# Patient Record
Sex: MALE | Race: WHITE | Employment: FULL TIME | URBAN - METROPOLITAN AREA
[De-identification: names, ages, dates, MRNs, and addresses within clinical notes are randomized per-mention and may not be internally consistent; named-entity substitution may affect disease eponyms.]

---

## 2017-06-28 ENCOUNTER — TELEPHONE (OUTPATIENT)
Dept: TRANSPLANT | Facility: CLINIC | Age: 27
End: 2017-06-28

## 2017-06-28 NOTE — TELEPHONE ENCOUNTER
"Logged in as: [email or web address]. Logout.  Incomplete   Pending   Registered   Archived Change Log Done Living Kidney Donor Evaluation Completed: 2017 11:11:58 CT Updated: 2017 11:12:08 CT  Donor Name: Shantanu Martinez MRN: Note: : 1990 Age: 27Gender: Male Donor Height: 5  8\" Weight (lb): 135 BMI: 20.5  Donor Race:  Ethnicity: Not / Donor Preferred Language: English  Required?: No Current Marital Status: Single  Demographics: Home Address: [address] [place] [address] City: [place] State: [place] Zip: [address] Country: [place]  Best Phone: [phone] Alt Phone: Donor Email: [email or web address] Best Phone Type: Mobile Alt Phone Type:   Preferred Contact Time(s): 09:00 AM-11:00 AM, 11:00 AM-1:00 PM, 1:00 PM-4:00 PM Preferred Contact Day(s): Mon, Tue, Wed, Thur, Fri  Donor Screen: PASSED Donor Referred by: Tx Candidate Donor self reported ABO: O  Recipient Information: Recipient Name (Last, First): Lisbet Carrasquillo Recipient :    1990  ... Donor Relationship: Acquaintance Recipient Diagnosis: Recipient ABO:   MEDICAL HISTORY:  None Reported  MEDICATIONS:  None Reported  SURGICAL HISTORY:  Fracture Repair, Right Forearm, NOS  G-Tube Placement  Inguinal Hernia Repair  Repair, Tracheoesophageal Fistula, NOS  ALLERGIES:  Amoxicillin : Rash  demoral : Rash  SOCIAL HISTORY:  EtOH: Rare (1-2 drinks/year)  Illicit Drug Use: Denies  Tobacco: Denies  SELF-REPORTED FUNCTIONAL STATUS:  \"I am able to participate in strenuous sports such as swimming, singles tennis, football, basketball, or skiing\"  Exercise (1 X per week)  REVIEW OF ORGAN SYSTEMS: Airway or Lungs: No Blood Disorder: No Cancer: No Diabetes,Thyroid,Adrenal,Endocrine Disorder: No Digestive or Liver: No Heart or Circulatory System: No Immune Diseases: No Kidneys and Bladder: No Male Health: No Muscles,Bones,Joints: No Neuro: No Psych: No  FAMILY HISTORY: Confirmed:  Denied:  Cancer (denies)  Diabetes (denies)  Heart Disease " (denies)  Hypertension (denies)  Kidney Disease (denies)  Kidney Stones (denies)  DONOR INFORMATION:  Level of Education: High school or secondary school degree complete Employment Status: Full Time Employer: [employer] Insurance Status: Has medical insurance Current Accommodation: Owns own home/apartment Living Arrangement: With spouse Allow Disclosure to Recipient: Yes Paired Kidney Exchange Education Level: Has learned about Paired Kidney Exchange Paired Kidney Exchange Participation Consent: Yes, only for mismatch Donor Motivation: Highly motivated donor  HIGH RISK BEHAVIOR:  Blood transfusion < 12 months. (NO)  Commercial sex < 12 months. (NO)  Illicit IV drug use < 5yrs. (NO)  Male:male sexual contact < 5yrs. (NO)  Other high risk sexual contact < 12 months. (NO)  EMERGENCY CONTACT INFORMATION:  Primary Secondary First Name: Kishore Last Name: Cyril Phone Number: [phone] Relationship: Mother  First Name: Lashawn  Last Name: Isiah Phone Number: [phone] Relationship: Spouse  REASON FOR DONATION:   I want to donate to help save a life and make sure someone get to live life to the fullest without any problems and the least amount of pain   PHYSICIAN CONTACT INFORMATION:  PCP  Name: Michael Zelaya   City: [place] State: [place]  Phone:   ADDITIONAL NOTES:   REVIEWED BY:    TODAY'S DATE:   ... Done  I want to donate to help save a life and make sure someone get to live life to the fullest without any problems and the least amount of pain

## 2017-06-29 ENCOUNTER — TELEPHONE (OUTPATIENT)
Dept: TRANSPLANT | Facility: CLINIC | Age: 27
End: 2017-06-29

## 2017-06-29 ENCOUNTER — DOCUMENTATION ONLY (OUTPATIENT)
Dept: TRANSPLANT | Facility: CLINIC | Age: 27
End: 2017-06-29

## 2017-06-29 NOTE — TELEPHONE ENCOUNTER
Is abo compat. Interested in PEP. Tested a couple years ago for another recip-we have his abo here. Will now send Phase 1 orders EXCEPT no abo since done prev and donor pkt.

## 2017-06-29 NOTE — TELEPHONE ENCOUNTER
Left message asking Shantanu to rewturn call for screening. Is abo O. Need to discuss his G Tube placement mentioned in his questionaire. No pkt sent yet.

## 2017-06-29 NOTE — PROGRESS NOTES
Gtube mentioned on Breeze was when he was an infant so no longer has. Was placed d/t Transesophageal Fistula. No problems since.

## 2017-07-20 ENCOUNTER — DOCUMENTATION ONLY (OUTPATIENT)
Dept: TRANSPLANT | Facility: CLINIC | Age: 27
End: 2017-07-20

## 2017-07-26 ENCOUNTER — TELEPHONE (OUTPATIENT)
Dept: TRANSPLANT | Facility: CLINIC | Age: 27
End: 2017-07-26

## 2017-07-26 DIAGNOSIS — Z00.5 TRANSPLANT DONOR EVALUATION: ICD-10-CM

## 2017-07-26 NOTE — TELEPHONE ENCOUNTER
Informed Sadiq his Phase 1's are OK. Average GFR=84. Confirmed interest in PEP also. Wants to come for eval. Has CD. [place] not left [place] in past 3 months.

## 2017-08-01 ENCOUNTER — APPOINTMENT (OUTPATIENT)
Dept: CHIROPRACTIC MEDICINE | Facility: OTHER | Age: 27
End: 2017-08-01

## 2017-08-01 ENCOUNTER — APPOINTMENT (OUTPATIENT)
Dept: OCCUPATIONAL MEDICINE | Facility: OTHER | Age: 27
End: 2017-08-01

## 2017-08-01 PROCEDURE — 99000 SPECIMEN HANDLING OFFICE-LAB: CPT

## 2017-08-01 PROCEDURE — 99499 UNLISTED E&M SERVICE: CPT

## 2017-08-01 PROCEDURE — 97799 UNLISTED PHYSCL MED/REHAB PX: CPT

## 2017-08-03 ENCOUNTER — ALLIED HEALTH/NURSE VISIT (OUTPATIENT)
Dept: TRANSPLANT | Facility: CLINIC | Age: 27
End: 2017-08-03
Attending: TRANSPLANT SURGERY

## 2017-08-03 ENCOUNTER — INFUSION THERAPY VISIT (OUTPATIENT)
Dept: INFUSION THERAPY | Facility: CLINIC | Age: 27
End: 2017-08-03
Attending: INTERNAL MEDICINE

## 2017-08-03 ENCOUNTER — OFFICE VISIT (OUTPATIENT)
Dept: TRANSPLANT | Facility: CLINIC | Age: 27
End: 2017-08-03
Attending: TRANSPLANT SURGERY

## 2017-08-03 ENCOUNTER — OFFICE VISIT (OUTPATIENT)
Dept: NEPHROLOGY | Facility: CLINIC | Age: 27
End: 2017-08-03
Attending: TRANSPLANT SURGERY

## 2017-08-03 VITALS — DIASTOLIC BLOOD PRESSURE: 75 MMHG | SYSTOLIC BLOOD PRESSURE: 116 MMHG

## 2017-08-03 VITALS
BODY MASS INDEX: 21.97 KG/M2 | DIASTOLIC BLOOD PRESSURE: 90 MMHG | SYSTOLIC BLOOD PRESSURE: 118 MMHG | HEIGHT: 67 IN | WEIGHT: 140 LBS | TEMPERATURE: 97.5 F | OXYGEN SATURATION: 98 % | HEART RATE: 64 BPM

## 2017-08-03 VITALS — DIASTOLIC BLOOD PRESSURE: 74 MMHG | SYSTOLIC BLOOD PRESSURE: 113 MMHG

## 2017-08-03 DIAGNOSIS — Z00.5 TRANSPLANT DONOR EVALUATION: Primary | ICD-10-CM

## 2017-08-03 DIAGNOSIS — Z01.818 PRE-OP EXAM: Primary | ICD-10-CM

## 2017-08-03 DIAGNOSIS — Z00.5 EXAMINATION OF POTENTIAL DONOR OF ORGAN AND TISSUE: Primary | ICD-10-CM

## 2017-08-03 DIAGNOSIS — Z00.5 TRANSPLANT DONOR EVALUATION: ICD-10-CM

## 2017-08-03 DIAGNOSIS — Z00.5 WILLING TO BE KIDNEY DONOR: ICD-10-CM

## 2017-08-03 LAB
ABO + RH BLD: NORMAL
ABO + RH BLD: NORMAL
ALBUMIN SERPL-MCNC: 4.6 G/DL (ref 3.4–5)
ALBUMIN UR-MCNC: NEGATIVE MG/DL
ALP SERPL-CCNC: 54 U/L (ref 40–150)
ALT SERPL W P-5'-P-CCNC: 28 U/L (ref 0–70)
ANION GAP SERPL CALCULATED.3IONS-SCNC: 8 MMOL/L (ref 3–14)
APPEARANCE UR: CLEAR
APTT PPP: 30 SEC (ref 22–37)
AST SERPL W P-5'-P-CCNC: 25 U/L (ref 0–45)
BILIRUB SERPL-MCNC: 1.3 MG/DL (ref 0.2–1.3)
BILIRUB UR QL STRIP: NEGATIVE
BLD GP AB SCN SERPL QL: NORMAL
BLOOD BANK CMNT PATIENT-IMP: NORMAL
BUN SERPL-MCNC: 16 MG/DL (ref 7–30)
CALCIUM SERPL-MCNC: 9.1 MG/DL (ref 8.5–10.1)
CHLORIDE SERPL-SCNC: 105 MMOL/L (ref 94–109)
CHOLEST SERPL-MCNC: 127 MG/DL
CMV IGG SERPL QL IA: NORMAL AI (ref 0–0.8)
CO2 SERPL-SCNC: 28 MMOL/L (ref 20–32)
COLOR UR AUTO: YELLOW
CREAT SERPL-MCNC: 1.1 MG/DL (ref 0.66–1.25)
CREAT UR-MCNC: 274 MG/DL
EBV VCA IGG SER QL IA: ABNORMAL AI (ref 0–0.8)
EBV VCA IGM SER QL IA: 0.3 AI (ref 0–0.8)
ERYTHROCYTE [DISTWIDTH] IN BLOOD BY AUTOMATED COUNT: 11.7 % (ref 10–15)
GFR SERPL CREATININE-BSD FRML MDRD: 81 ML/MIN/1.7M2
GLUCOSE SERPL-MCNC: 93 MG/DL (ref 70–99)
GLUCOSE UR STRIP-MCNC: NEGATIVE MG/DL
HBA1C MFR BLD: 5 % (ref 4.3–6)
HBV CORE AB SERPL QL IA: NONREACTIVE
HBV SURFACE AB SERPL IA-ACNC: ABNORMAL M[IU]/ML
HBV SURFACE AG SERPL QL IA: NONREACTIVE
HCT VFR BLD AUTO: 45 % (ref 40–53)
HCV AB SERPL QL IA: NORMAL
HDLC SERPL-MCNC: 64 MG/DL
HGB BLD-MCNC: 16.2 G/DL (ref 13.3–17.7)
HGB UR QL STRIP: NEGATIVE
HIV 1+2 AB+HIV1 P24 AG SERPL QL IA: NORMAL
INR PPP: 1.1 (ref 0.86–1.14)
KETONES UR STRIP-MCNC: NEGATIVE MG/DL
LDLC SERPL CALC-MCNC: 55 MG/DL
LEUKOCYTE ESTERASE UR QL STRIP: NEGATIVE
MCH RBC QN AUTO: 31.8 PG (ref 26.5–33)
MCHC RBC AUTO-ENTMCNC: 36 G/DL (ref 31.5–36.5)
MCV RBC AUTO: 88 FL (ref 78–100)
MICROALBUMIN UR-MCNC: 7 MG/L
MICROALBUMIN/CREAT UR: 2.66 MG/G CR (ref 0–17)
MUCOUS THREADS #/AREA URNS LPF: PRESENT /LPF
NITRATE UR QL: NEGATIVE
NONHDLC SERPL-MCNC: 62 MG/DL
PH UR STRIP: 6 PH (ref 5–7)
PHOSPHATE SERPL-MCNC: 3.8 MG/DL (ref 2.5–4.5)
PLATELET # BLD AUTO: 183 10E9/L (ref 150–450)
POTASSIUM SERPL-SCNC: 4 MMOL/L (ref 3.4–5.3)
PROT SERPL-MCNC: 7.5 G/DL (ref 6.8–8.8)
PROT UR-MCNC: 0.13 G/L
PROT/CREAT 24H UR: 0.05 G/G CR (ref 0–0.2)
RBC # BLD AUTO: 5.09 10E12/L (ref 4.4–5.9)
RBC #/AREA URNS AUTO: 0 /HPF (ref 0–2)
SODIUM SERPL-SCNC: 142 MMOL/L (ref 133–144)
SP GR UR STRIP: 1.02 (ref 1–1.03)
SPECIMEN EXP DATE BLD: NORMAL
SQUAMOUS #/AREA URNS AUTO: <1 /HPF (ref 0–1)
T PALLIDUM IGG+IGM SER QL: NEGATIVE
TRIGL SERPL-MCNC: 36 MG/DL
URATE SERPL-MCNC: 5.2 MG/DL (ref 3.5–7.2)
URN SPEC COLLECT METH UR: ABNORMAL
UROBILINOGEN UR STRIP-MCNC: 2 MG/DL (ref 0–2)
WBC # BLD AUTO: 4 10E9/L (ref 4–11)
WBC #/AREA URNS AUTO: <1 /HPF (ref 0–2)

## 2017-08-03 PROCEDURE — 84100 ASSAY OF PHOSPHORUS: CPT | Performed by: INTERNAL MEDICINE

## 2017-08-03 PROCEDURE — 85027 COMPLETE CBC AUTOMATED: CPT | Performed by: INTERNAL MEDICINE

## 2017-08-03 PROCEDURE — 86706 HEP B SURFACE ANTIBODY: CPT | Performed by: INTERNAL MEDICINE

## 2017-08-03 PROCEDURE — 87340 HEPATITIS B SURFACE AG IA: CPT | Performed by: INTERNAL MEDICINE

## 2017-08-03 PROCEDURE — 25000128 H RX IP 250 OP 636: Mod: JW,ZF | Performed by: INTERNAL MEDICINE

## 2017-08-03 PROCEDURE — 36415 COLL VENOUS BLD VENIPUNCTURE: CPT | Performed by: INTERNAL MEDICINE

## 2017-08-03 PROCEDURE — 86780 TREPONEMA PALLIDUM: CPT | Performed by: INTERNAL MEDICINE

## 2017-08-03 PROCEDURE — 86644 CMV ANTIBODY: CPT | Performed by: INTERNAL MEDICINE

## 2017-08-03 PROCEDURE — 83036 HEMOGLOBIN GLYCOSYLATED A1C: CPT | Performed by: INTERNAL MEDICINE

## 2017-08-03 PROCEDURE — 81001 URINALYSIS AUTO W/SCOPE: CPT | Performed by: INTERNAL MEDICINE

## 2017-08-03 PROCEDURE — 86665 EPSTEIN-BARR CAPSID VCA: CPT | Performed by: INTERNAL MEDICINE

## 2017-08-03 PROCEDURE — 80061 LIPID PANEL: CPT | Performed by: INTERNAL MEDICINE

## 2017-08-03 PROCEDURE — 40000269 ZZH STATISTIC NO CHARGE FACILITY FEE: Mod: ZF

## 2017-08-03 PROCEDURE — 84550 ASSAY OF BLOOD/URIC ACID: CPT | Performed by: INTERNAL MEDICINE

## 2017-08-03 PROCEDURE — 86850 RBC ANTIBODY SCREEN: CPT | Performed by: INTERNAL MEDICINE

## 2017-08-03 PROCEDURE — 86803 HEPATITIS C AB TEST: CPT | Performed by: INTERNAL MEDICINE

## 2017-08-03 PROCEDURE — 86480 TB TEST CELL IMMUN MEASURE: CPT | Performed by: INTERNAL MEDICINE

## 2017-08-03 PROCEDURE — 82542 COL CHROMOTOGRAPHY QUAL/QUAN: CPT | Performed by: INTERNAL MEDICINE

## 2017-08-03 PROCEDURE — 82043 UR ALBUMIN QUANTITATIVE: CPT | Performed by: INTERNAL MEDICINE

## 2017-08-03 PROCEDURE — 85610 PROTHROMBIN TIME: CPT | Performed by: INTERNAL MEDICINE

## 2017-08-03 PROCEDURE — 86900 BLOOD TYPING SEROLOGIC ABO: CPT | Performed by: INTERNAL MEDICINE

## 2017-08-03 PROCEDURE — 85730 THROMBOPLASTIN TIME PARTIAL: CPT | Performed by: INTERNAL MEDICINE

## 2017-08-03 PROCEDURE — 80053 COMPREHEN METABOLIC PANEL: CPT | Performed by: INTERNAL MEDICINE

## 2017-08-03 PROCEDURE — 86901 BLOOD TYPING SEROLOGIC RH(D): CPT | Performed by: INTERNAL MEDICINE

## 2017-08-03 PROCEDURE — 84156 ASSAY OF PROTEIN URINE: CPT | Performed by: INTERNAL MEDICINE

## 2017-08-03 PROCEDURE — 40000866 ZZHCL STATISTIC HIV 1/2 ANTIGEN/ANTIBODY PRETRANSPLANT ONLY: Performed by: INTERNAL MEDICINE

## 2017-08-03 PROCEDURE — 86704 HEP B CORE ANTIBODY TOTAL: CPT | Performed by: INTERNAL MEDICINE

## 2017-08-03 PROCEDURE — 86665 EPSTEIN-BARR CAPSID VCA: CPT | Mod: 91 | Performed by: INTERNAL MEDICINE

## 2017-08-03 RX ADMIN — IOHEXOL 5 ML: 300 INJECTION, SOLUTION INTRAVENOUS at 09:20

## 2017-08-03 ASSESSMENT — PAIN SCALES - GENERAL: PAINLEVEL: NO PAIN (0)

## 2017-08-03 NOTE — MR AVS SNAPSHOT
"              After Visit Summary   8/3/2017    Shantanu Martinez    MRN: [id number]           Patient Information     Date Of Birth          1990        Visit Information        Provider Department      8/3/2017 2:00 PM Liu Wright MD [facility] Nephrology        Today's Diagnoses     Pre-op exam    -  1    Willing to be kidney donor           Follow-ups after your visit        Who to contact     If you have questions or need follow up information about today's clinic visit or your schedule please contact [facility] NEPHROLOGY directly at [phone].  Normal or non-critical lab and imaging results will be communicated to you by Edusofthart, letter or phone within 4 business days after the clinic has received the results. If you do not hear from us within 7 days, please contact the clinic through Edusofthart or phone. If you have a critical or abnormal lab result, we will notify you by phone as soon as possible.  Submit refill requests through [employer] or call your pharmacy and they will forward the refill request to us. Please allow 3 business days for your refill to be completed.          Additional Information About Your Visit        MyChart Information     [employer] lets you send messages to your doctor, view your test results, renew your prescriptions, schedule appointments and more. To sign up, go to [email or web address] . Click on \"Log in\" on the left side of the screen, which will take you to the Welcome page. Then click on \"Sign up Now\" on the right side of the page.     You will be asked to enter the access code listed below, as well as some personal information. Please follow the directions to create your username and password.     Your access code is: [email or web address]  Expires: 2017  2:19 PM     Your access code will  in 90 days. If you need help or a new code, please call your [place] clinic or [phone].        Care EveryWhere ID     This is your Care EveryWhere ID. This could be used by " other organizations to access your [place] medical records  [id number]         Blood Pressure from Last 3 Encounters:   08/03/17 116/75   08/03/17 113/74   08/03/17 118/90    Weight from Last 3 Encounters:   08/03/17 63.5 kg (140 lb)   11/22/13 59 kg (130 lb)   09/06/13 59.9 kg (132 lb)              Today, you had the following     No orders found for display       Primary Care Provider Office Phone # Fax #    Michael Zelaya [phone]       [facility] [address] [address]        Equal Access to Services     [facility]: Hadii aakash inman hadralpho Sonilson, waaxda luqadaha, qaybta kaalmada rula, dee oscar . So [facility] [phone].    ATENCIÓN: Si habla español, tiene a biggs disposición servicios gratuitos de asistencia lingüística. [facility] [phone].    We comply with applicable federal civil rights laws and [place] laws. We do not discriminate on the basis of race, color, national origin, age, disability sex, sexual orientation or gender identity.            Thank you!     Thank you for choosing [facility] NEPHROLOGY  for your care. Our goal is always to provide you with excellent care. Hearing back from our patients is one way we can continue to improve our services. Please take a few minutes to complete the written survey that you may receive in the mail after your visit with us. Thank you!             Your Updated Medication List - Protect others around you: Learn how to safely use, store and throw away your medicines at www.disposemymeds.org.          This list is accurate as of: 8/3/17 11:59 PM.  Always use your most recent med list.                   Brand Name Dispense Instructions for use Diagnosis    multivitamin, therapeutic with minerals Tabs tablet      Take 1 tablet by mouth daily

## 2017-08-03 NOTE — MR AVS SNAPSHOT
After Visit Summary   8/3/2017    Shantanu Martinez    MRN: [id number]           Patient Information     Date Of Birth          1990        Visit Information        Provider Department      8/3/2017 1:30 PM Mariposa Zafar RD [facility] Solid Organ Transplant        Today's Diagnoses     Transplant donor evaluation    -  1       Follow-ups after your visit        Your next 10 appointments already scheduled     Aug 03, 2017  3:00 PM CDT   (Arrive by 2:45 PM)   XR CHEST 2 VIEWS with UCXR1   [facility] Xray ([facility])    [address] [address] [phone]           Please bring a list of your current medicines to your exam. (Include vitamins, minerals and over-thecounter medicines.) Leave your valuables at home.  Tell your doctor if there is a chance you may be pregnant.  You do not need to do anything special for this exam.            Aug 03, 2017  3:20 PM CDT   (Arrive by 3:05 PM)   CT RENAL ANGIO with UCCT1   [facility] CT ([facility])    [address] [address] [phone]           Please bring any scans or X-rays taken at other hospitals, if similar tests were done. Also bring a list of your medicines, including vitamins, minerals and over-the-counter drugs. It is safest to leave personal items at home.  Be sure to tell your doctor:   If you have any allergies.   If there s any chance you are pregnant.   If you are breastfeeding.   If you have any special needs.  You will have contrast for this exam. To prepare:   Do not eat or drink for 2 hours before your exam. If you need to take medicine, you may take it with small sips of water. (We may ask you to take liquid medicine as well.)   The day before your exam, drink extra fluids at least six 8-ounce glasses (unless your doctor tells you to restrict your fluids).  Patients over 70 or  "patients with diabetes or kidney problems:   If you haven t had a blood test (creatinine test) within the last 30 days, go to your clinic or Diagnostic Imaging Department for this test.  If you have diabetes:   If your kidney function is normal, continue taking your metformin (Avandamet, Glucophage, Glucovance, Metaglip) on the day of your exam.   If your kidney function is abnormal, wait 48 hours before restarting this medicine.  Please wear loose clothing, such as a sweat suit or jogging clothes. Avoid snaps, zippers and other metal. We may ask you to undress and put on a hospital gown.  If you have any questions, please call the Imaging Department where you will have your exam.              Who to contact     If you have questions or need follow up information about today's clinic visit or your schedule please contact [facility] SOLID ORGAN TRANSPLANT directly at [phone].  Normal or non-critical lab and imaging results will be communicated to you by [email or web address], letter or phone within 4 business days after the clinic has received the results. If you do not hear from us within 7 days, please contact the clinic through [email or web address] or phone. If you have a critical or abnormal lab result, we will notify you by phone as soon as possible.  Submit refill requests through [employer] or call your pharmacy and they will forward the refill request to us. Please allow 3 business days for your refill to be completed.          Additional Information About Your Visit        Notrefamille.comharS[employer] Information     [employer] Software lets you send messages to your doctor, view your test results, renew your prescriptions, schedule appointments and more. To sign up, go to [email or web address] . Click on \"Log in\" on the left side of the screen, which will take you to the Welcome page. Then click on \"Sign up Now\" on the right side of the page.     You will be asked to enter the access code listed below, as well as some personal information. Please follow the " directions to create your username and password.     Your access code is: [email or web address]  Expires: 2017  2:19 PM     Your access code will  in 90 days. If you need help or a new code, please call your [place] clinic or [phone].        Care EveryWhere ID     This is your Care EveryWhere ID. This could be used by other organizations to access your [place] medical records  [id number]         Blood Pressure from Last 3 Encounters:   17 116/75   17 113/74   17 118/90    Weight from Last 3 Encounters:   17 63.5 kg (140 lb)   13 59 kg (130 lb)   13 59.9 kg (132 lb)              Today, you had the following     No orders found for display       Primary Care Provider Office Phone # Fax #    Michael Zelaya [phone]       [facility] [address] [address]        Equal Access to Services     [facility]: Hadii aad ku hadasho Soomaali, waaxda luqadaha, qaybta kaalmada adeegyada, waxay idiin haychristin ragini oscar . So [facility] [phone].    ATENCIÓN: Si habla español, tiene a biggs disposición servicios gratuitos de asistencia lingüística. Llame al [phone].    We comply with applicable federal civil rights laws and [place] laws. We do not discriminate on the basis of race, color, national origin, age, disability sex, sexual orientation or gender identity.            Thank you!     Thank you for choosing [facility] SOLID ORGAN TRANSPLANT  for your care. Our goal is always to provide you with excellent care. Hearing back from our patients is one way we can continue to improve our services. Please take a few minutes to complete the written survey that you may receive in the mail after your visit with us. Thank you!             Your Updated Medication List - Protect others around you: Learn how to safely use, store and throw away your medicines at www.disposemymeds.org.          This list is accurate as of: 8/3/17  2:19 PM.   Always use your most recent med list.                   Brand Name Dispense Instructions for use Diagnosis    multivitamin, therapeutic with minerals Tabs tablet      Take 1 tablet by mouth daily

## 2017-08-03 NOTE — MR AVS SNAPSHOT
After Visit Summary   8/3/2017    Shantanu Martinez    MRN: [id number]           Patient Information     Date Of Birth          1990        Visit Information        Provider Department      8/3/2017 1:00 PM Baljit Carrillo MD [facility] Solid Organ Transplant        Today's Diagnoses     Transplant donor evaluation    -  1       Follow-ups after your visit        Your next 10 appointments already scheduled     Aug 03, 2017  3:20 PM CDT   (Arrive by 3:05 PM)   CT RENAL ANGIO with UCCT1   [facility] CT ([facility])    [address] [address] [phone]           Please bring any scans or X-rays taken at other hospitals, if similar tests were done. Also bring a list of your medicines, including vitamins, minerals and over-the-counter drugs. It is safest to leave personal items at home.  Be sure to tell your doctor:   If you have any allergies.   If there s any chance you are pregnant.   If you are breastfeeding.   If you have any special needs.  You will have contrast for this exam. To prepare:   Do not eat or drink for 2 hours before your exam. If you need to take medicine, you may take it with small sips of water. (We may ask you to take liquid medicine as well.)   The day before your exam, drink extra fluids at least six 8-ounce glasses (unless your doctor tells you to restrict your fluids).  Patients over 70 or patients with diabetes or kidney problems:   If you haven t had a blood test (creatinine test) within the last 30 days, go to your clinic or Diagnostic Imaging Department for this test.  If you have diabetes:   If your kidney function is normal, continue taking your metformin (Avandamet, Glucophage, Glucovance, Metaglip) on the day of your exam.   If your kidney function is abnormal, wait 48 hours before restarting this medicine.  Please wear loose clothing, such as a sweat suit or jogging clothes. Avoid snaps, zippers  "and other metal. We may ask you to undress and put on a hospital gown.  If you have any questions, please call the Imaging Department where you will have your exam.              Who to contact     If you have questions or need follow up information about today's clinic visit or your schedule please contact [facility] SOLID ORGAN TRANSPLANT directly at [phone].  Normal or non-critical lab and imaging results will be communicated to you by JustShareIthart, letter or phone within 4 business days after the clinic has received the results. If you do not hear from us within 7 days, please contact the clinic through JustShareIthart or phone. If you have a critical or abnormal lab result, we will notify you by phone as soon as possible.  Submit refill requests through WatchDox or call your pharmacy and they will forward the refill request to us. Please allow 3 business days for your refill to be completed.          Additional Information About Your Visit        WatchDox Information     WatchDox lets you send messages to your doctor, view your test results, renew your prescriptions, schedule appointments and more. To sign up, go to [email or web address] . Click on \"Log in\" on the left side of the screen, which will take you to the Welcome page. Then click on \"Sign up Now\" on the right side of the page.     You will be asked to enter the access code listed below, as well as some personal information. Please follow the directions to create your username and password.     Your access code is: [email or web address]  Expires: 2017  2:19 PM     Your access code will  in 90 days. If you need help or a new code, please call your [place] clinic or [phone].        Care EveryWhere ID     This is your Care EveryWhere ID. This could be used by other organizations to access your [place] medical records  [id number]         Blood Pressure from Last 3 Encounters:   17 116/75   17 113/74   17 118/90    Weight from Last 3 " Encounters:   08/03/17 63.5 kg (140 lb)   11/22/13 59 kg (130 lb)   09/06/13 59.9 kg (132 lb)              Today, you had the following     No orders found for display       Primary Care Provider Office Phone # Fax #    Michael Zelaya [phone]       [facility] [address] [address]        Equal Access to Services     [facility]: Hadii aad ku hadasho Soomaali, waaxda luqadaha, qaybta kaalmada adeegyada, waxay idiin hayaan adeeg etiennepeggyaury latonny . So [facility] [phone].    ATENCIÓN: Si habla español, tiene a biggs disposición servicios gratuitos de asistencia lingüística. Llame al [phone].    We comply with applicable federal civil rights laws and [place] laws. We do not discriminate on the basis of race, color, national origin, age, disability sex, sexual orientation or gender identity.            Thank you!     Thank you for choosing [facility] SOLID ORGAN TRANSPLANT  for your care. Our goal is always to provide you with excellent care. Hearing back from our patients is one way we can continue to improve our services. Please take a few minutes to complete the written survey that you may receive in the mail after your visit with us. Thank you!             Your Updated Medication List - Protect others around you: Learn how to safely use, store and throw away your medicines at www.disposemymeds.org.          This list is accurate as of: 8/3/17  3:18 PM.  Always use your most recent med list.                   Brand Name Dispense Instructions for use Diagnosis    multivitamin, therapeutic with minerals Tabs tablet      Take 1 tablet by mouth daily

## 2017-08-03 NOTE — PROGRESS NOTES
RE: Shantanu Martinez  [facility] #[id number]           I saw your patient, Shantanu Martinez, in consultation in our pretransplant clinic. He was here at clinic with his fiance for evaluation as a possible kidney donor to the finace of a friend.  He and his fiance had seen our donor video.  Our donor coordinator shared our center-specific results with him.  We discussed:    (1) The risks of donation--including mortality (0.03% risk) and morbidity (approximately 1% risk of major morbidity).  We discussed major reported causes of death after kidney donation (bleeding, infection, pulmonary embolism).  We discussed the potential complications, including:  bleeding requiring reoperation, infection requiring reoperation, pneumonia, bowel obstruction, infection at the site of the incision, hernia at the site of the incision, deep vein thrombosis, deep vein thrombosis with associated pulmonary embolism, and urinary tract infection.  I told him I could not possibly name all of the risks, but that he was at risk for any complications that could occur in any operation.       (2) We also discussed the long-term risks of living with one kidney.  We talked about the new publications suggesting slightly increased long-term risk of ESRD after donor nephrectomy.  I discussed with him the fact that our outcome data is limited to 30-40 years after nephrectomy.  For people his age, there is really no data to show what the outcome will be at 50-60 years.  This is an important consideration.  We discussed the rare diseases that might affect having only one kidney.    (3) The hospital stay and the fact that if all goes well, discharge would occur within two to three days after donor nephrectomy.  We also discussed the fact that there would be no diet or fluid restrictions (again if all went well), and that the only new medication that he would be on would be pain medication.  We discussed the fact that most patients are on Tylenol or something  similar within five to six days of surgery.      (4) The recovery time after surgery and the restrictions that are necessary:  a) no driving for a couple of weeks (or until he felt that his reaction would be adequate if he had to slam on the brakes; and b) no lifting over 10 pounds or any exercise that would stretch his abdominal muscles for six weeks (to allow the muscles to heal so that he doesn't develop a hernia).  We also discussed return to work, which could occur in approximately three to four weeks if he had a desk job or would require not returning to work for six weeks if the job required any heavy lifting or exercise.  We discussed the potential for not getting his pep and energy back for anywhere from two to six weeks after surgery and how there was a tremendous individual variation in return of full energy level.    (5) The concern about long distance travel for the first couple of weeks post-donation.  We discussed the risks of deep vein thrombosis associated with plane or car travel.  I recommended that, if flying, he should get up and walk around every 30-40 minutes; if driving he should ask the  to stop the car every 30-45 minutes so Mr. Martinez could take a short walk.    (6) The fact that the recipient could be on a waiting list for  donor transplant and would ultimately receive a kidney if Mr. Martinez did not donate.      I attempted to answer any remaining questions.  I also told him that should he have any questions, he should feel free to contact us.  We would be glad to answer any questions either over the phone or at another clinic visit.  His transplant coordinator is Jacqueline Hennessy and may be reached at [phone].  Thank you for the opportunity to see him.    I spent 35 minutes with this patient.  Over 95% that time was spent in counseling and coordination of care.             Yours truly,               Baljit Carrillo MD         Professor of  Surgery         ([phone])          AJM/st

## 2017-08-03 NOTE — PROGRESS NOTES
NUTRITION KIDNEY DONOR EVALUATION  Medical Nutrition Therapy    Weight and BMI:  Current BMI: 21.9  BMI is within criteria of <30 for kidney donation    8 Year Risk of Diabetes:  </= 3%       Visit Type: Initial Assessment    Shantanu COLLAZO Martinez referred by Dr. Carrillo for MNT related to potential kidney donor evaluation    Patient accompanied by self    Nutrition Assessment:  Anthropometrics  Height:   67  in   BMI:    21.9    Weight Status:Normal BMI   Weight:  140 lbs            IBW (lbs): 148  % IBW: 95    Wt Hx: No changes    Adj/dosing BW: 140 lbs/64 kg       Recent Labs   Lab Test  08/03/17   0908   CHOL  127   HDL  64   LDL  55   TRIG  36       BG = 93  A1C = 5      Prediction of Incident Diabetes Mellitus in Middle-aged Adults: The [place] Offspring Study  Mil Sarah MD; James B. Meigs, MD, MPH; Keshia Pak, PhD; Marium Alexander MD, MPH; Phillip Melgar MD; Ki Umana Sr,   PhD  Pt's estimated risk for T2DM (per Table 6 above)  Pt received points for the following criteria: none  Total points: 0  8-Year risk of T2DM: </= 3%    Nutrition History  Dining out/food not made at home: 2x/month  Vitamins, Supplements, Herbals, Pertinent Meds: MVI  Pt is cognizant of monitoring sodium intake as one of his relatives has had 2 heart attacks.     Recall:  B: egg muffin with sausage made at home  L: s/w or leftovers  D: meat/chix with potatoes, some veggies   Sn: granola bar, some sweets  Beverages: water, coffee, Gatorade, some milk   ETOH (1 drink = 12 oz beer, 5 oz wine, 1.5 oz liquor): 1-2 beers/month     Physical Activity  Active at home with his 3 kids      Nutrition Prescription  Estimated Energy Needs: 9827-6433 kcals (25-30 Kcal/Kg)  Justification: maintenance    Estimated Protein Needs: 51-64 protein (0.8-1 g pro/Kg)  Justification: maintenance    Estimated Fluid Needs:  (1 mL/Kcal)  Justification: maintenance     Fiber: 25-30 g/day     Nutrition Diagnosis:  Food and nutrition related  knowledge deficit r/t pre transplant donor eval pt AEB pt verbalized not hearing pre/post transplant diet guidelines.    Nutrition Intervention:  Nutrition education provided:  Reviewed overall healthy diet guidelines for pre and post kidney donation. Discussed maintenance of a healthy weight, Na+ intake <3000 mg/day, and avoidance of herbal supplements post donation d/t unknown effects on the kidney.     Patient Understanding: Pt verbalized understanding of education provided.  Expected Compliance: Good  Follow-Up Plans: PRN     Nutrition Goals:  Na+ <3000 mg/day    Provided pt with contact info.    Time spent with patient: 15 minutes.  Mariposa Zafar, RD, LD

## 2017-08-03 NOTE — MR AVS SNAPSHOT
"              After Visit Summary   8/3/2017    Shantanu Martinez    MRN: [id number]           Patient Information     Date Of Birth          1990        Visit Information        Provider Department      8/3/2017 11:00 AM Cris Chacko S.A., RN [facility] Solid Organ Transplant        Today's Diagnoses     Examination of potential donor of organ and tissue    -  1       Follow-ups after your visit        Who to contact     If you have questions or need follow up information about today's clinic visit or your schedule please contact [facility] SOL[facility] ORGAN TRANSPLANT directly at [phone].  Normal or non-critical lab and imaging results will be communicated to you by [employer], letter or phone within 4 business days after the clinic has received the results. If you do not hear from us within 7 days, please contact the clinic through [employer] or phone. If you have a critical or abnormal lab result, we will notify you by phone as soon as possible.  Submit refill requests through [employer] or call your pharmacy and they will forward the refill request to us. Please allow 3 business days for your refill to be completed.          Additional Information About Your Visit        MyChart Information     [employer] lets you send messages to your doctor, view your test results, renew your prescriptions, schedule appointments and more. To sign up, go to [email or web address] . Click on \"Log in\" on the left side of the screen, which will take you to the Welcome page. Then click on \"Sign up Now\" on the right side of the page.     You will be asked to enter the access code listed below, as well as some personal information. Please follow the directions to create your username and password.     Your access code is: [email or web address]  Expires: 2017  2:19 PM     Your access code will  in 90 days. If you need help or a new code, please call your [place] clinic or [phone].        Care EveryWhere ID     This is your Care " EveryWhere ID. This could be used by other organizations to access your [place] medical records  [id number]         Blood Pressure from Last 3 Encounters:   08/03/17 116/75   08/03/17 113/74   08/03/17 118/90    Weight from Last 3 Encounters:   08/03/17 63.5 kg (140 lb)   11/22/13 59 kg (130 lb)   09/06/13 59.9 kg (132 lb)              Today, you had the following     No orders found for display       Primary Care Provider Office Phone # Fax #    Michael Zelaya [phone]       [facility] [address] [address]        Equal Access to Services     [facility] : Hemant Leblanc, wachad be, qachilota kaalmada rula, dee oscar . So [facility] [phone].    ATENCIÓN: Si habla español, tiene a biggs disposición servicios gratuitos de asistencia lingüística. Llame al [phone].    We comply with applicable federal civil rights laws and [place] laws. We do not discriminate on the basis of race, color, national origin, age, disability sex, sexual orientation or gender identity.            Thank you!     Thank you for choosing [facility] SOLID ORGAN TRANSPLANT  for your care. Our goal is always to provide you with excellent care. Hearing back from our patients is one way we can continue to improve our services. Please take a few minutes to complete the written survey that you may receive in the mail after your visit with us. Thank you!             Your Updated Medication List - Protect others around you: Learn how to safely use, store and throw away your medicines at www.disposemymeds.org.          This list is accurate as of: 8/3/17  4:28 PM.  Always use your most recent med list.                   Brand Name Dispense Instructions for use Diagnosis    multivitamin, therapeutic with minerals Tabs tablet      Take 1 tablet by mouth daily

## 2017-08-03 NOTE — LETTER
8/3/2017       RE: Shantanu Martinez  [address] [facility] [address]     Dear Colleague,    Thank you for referring your patient, Shantanu Martinez, to the [facility] SOLID ORGAN TRANSPLANT at [facility]. Please see a copy of my visit note below.    RE: Shantanu Martinez  [facility] #[id number]           I saw your patient, Shantanu Martinez, in consultation in our pretransplant clinic. He was here at clinic with his fiance for evaluation as a possible kidney donor to the finace of a friend.  He and his fiance had seen our donor video.  Our donor coordinator shared our center-specific results with him.  We discussed:    (1) The risks of donation--including mortality (0.03% risk) and morbidity (approximately 1% risk of major morbidity).  We discussed major reported causes of death after kidney donation (bleeding, infection, pulmonary embolism).  We discussed the potential complications, including:  bleeding requiring reoperation, infection requiring reoperation, pneumonia, bowel obstruction, infection at the site of the incision, hernia at the site of the incision, deep vein thrombosis, deep vein thrombosis with associated pulmonary embolism, and urinary tract infection.  I told him I could not possibly name all of the risks, but that he was at risk for any complications that could occur in any operation.       (2) We also discussed the long-term risks of living with one kidney.  We talked about the new publications suggesting slightly increased long-term risk of ESRD after donor nephrectomy.  I discussed with him the fact that our outcome data is limited to 30-40 years after nephrectomy.  For people his age, there is really no data to show what the outcome will be at 50-60 years.  This is an important consideration.  We discussed the rare diseases that might affect having only one kidney.    (3) The hospital stay and the fact that if all goes well, discharge would occur within two to three days after  donor nephrectomy.  We also discussed the fact that there would be no diet or fluid restrictions (again if all went well), and that the only new medication that he would be on would be pain medication.  We discussed the fact that most patients are on Tylenol or something similar within five to six days of surgery.      (4) The recovery time after surgery and the restrictions that are necessary:  a) no driving for a couple of weeks (or until he felt that his reaction would be adequate if he had to slam on the brakes; and b) no lifting over 10 pounds or any exercise that would stretch his abdominal muscles for six weeks (to allow the muscles to heal so that he doesn't develop a hernia).  We also discussed return to work, which could occur in approximately three to four weeks if he had a desk job or would require not returning to work for six weeks if the job required any heavy lifting or exercise.  We discussed the potential for not getting his pep and energy back for anywhere from two to six weeks after surgery and how there was a tremendous individual variation in return of full energy level.    (5) The concern about long distance travel for the first couple of weeks post-donation.  We discussed the risks of deep vein thrombosis associated with plane or car travel.  I recommended that, if flying, he should get up and walk around every 30-40 minutes; if driving he should ask the  to stop the car every 30-45 minutes so Mr. Martinez could take a short walk.    (6) The fact that the recipient could be on a waiting list for  donor transplant and would ultimately receive a kidney if Mr. Martinez did not donate.      I attempted to answer any remaining questions.  I also told him that should he have any questions, he should feel free to contact us.  We would be glad to answer any questions either over the phone or at another clinic visit.  His transplant coordinator is Jacqueline Hennessy and may be reached at  [phone].  Thank you for the opportunity to see him.    I spent 35 minutes with this patient.  Over 95% that time was spent in counseling and coordination of care.             Yours truly,               Baljit Carrillo MD         Professor of Surgery         ([phone])          ASHLEY/

## 2017-08-03 NOTE — PROGRESS NOTES
Donor Iohexol test    Shantanu Martinez presents today to [facility] for a Donor Iohexol test.      Progress note:  ID verified by name and .     The following information was verified with the patient:  Female Patients is there any possibility of being pregnant No  Is there a history of allergy (skin rash, swelling, ect) to:   A.  Iodine (except skin reactions to betadine): No   B. Intravenous radio-contrast agents: No   C. Seafood No    R.N. provided patient with educational handout regarding timed test. Yes    20 gauge PIV placed in RT AC.  Iohexol administered.  Following iohexol administration extension set replaced.  PIV left in place for blood draws and CT this afternoon    Medication administered:  Iohexol (Omnipaque 300mg iodine/ml concentration) 5 mls.    Start time: 920  Stop time: 922    Drug Waste Record    Drug Name: Iohexol   Dose:5 ml  Route administered: IV  Amount of waste(mL):5 ml  Reason for waste: Single use vial      Evaluation nurse in transplant to draw labs at 2 and 4 hours post iohexol administration.  Patient given a slip with the times to get labs drawn and verbalized understanding of the plan.    Patient tolerated the procedure:  Yes    After the infusion patient was discharged to the next appointment.    Cleo Christie RN    Administrations This Visit     iohexol (OMNIPAQUE 300) injection 5 mL     Admin Date Action Dose Route Administered By             2017 Given 5 mL Intravenous Cleo Christie, RN

## 2017-08-03 NOTE — MR AVS SNAPSHOT
"              After Visit Summary   8/3/2017    Shantanu Martinez    MRN: [id number]           Patient Information     Date Of Birth          1990        Visit Information        Provider Department      8/3/2017 9:00 AM UC 48 ATC; UC SPEC INFUSION [facility]        Today's Diagnoses     Transplant donor evaluation    -  1       Follow-ups after your visit        Who to contact     If you have questions or need follow up information about today's clinic visit or your schedule please contact [facility] PROCED[facility] directly at [phone].  Normal or non-critical lab and imaging results will be communicated to you by [employer], letter or phone within 4 business days after the clinic has received the results. If you do not hear from us within 7 days, please contact the clinic through [employer] or phone. If you have a critical or abnormal lab result, we will notify you by phone as soon as possible.  Submit refill requests through [employer] or call your pharmacy and they will forward the refill request to us. Please allow 3 business days for your refill to be completed.          Additional Information About Your Visit        MyChart Information     [employer] lets you send messages to your doctor, view your test results, renew your prescriptions, schedule appointments and more. To sign up, go to [email or web address] . Click on \"Log in\" on the left side of the screen, which will take you to the Welcome page. Then click on \"Sign up Now\" on the right side of the page.     You will be asked to enter the access code listed below, as well as some personal information. Please follow the directions to create your username and password.     Your access code is: [email or web address]  Expires: 2017  2:19 PM     Your access code will  in 90 days. If you need help or a new code, please call your [place] clinic or [phone].        Care EveryWhere " ID     This is your Care EveryWhere ID. This could be used by other organizations to access your [place] medical records  [id number]         Blood Pressure from Last 3 Encounters:   08/03/17 116/75   08/03/17 113/74   08/03/17 118/90    Weight from Last 3 Encounters:   08/03/17 63.5 kg (140 lb)   11/22/13 59 kg (130 lb)   09/06/13 59.9 kg (132 lb)              We Performed the Following     Iohexol        Primary Care Provider Office Phone # Fax #    Michael Zelaya [phone]       [facility] [address] [address]        Equal Access to Services     FRANK MIKE : Hemant Leblanc, wachad be, q[place]il[place] kaalmada rula, dee oscar . So [facility] [phone].    ATENCIÓN: Si habla español, tiene a biggs disposición servicios gratuitos de asistencia lingüística. Llame al [phone].    We comply with applicable federal civil rights laws and [place] laws. We do not discriminate on the basis of race, color, national origin, age, disability sex, sexual orientation or gender identity.            Thank you!     Thank you for choosing [facility] SPECIALTY [facility] PROCEDURE  for your care. Our goal is always to provide you with excellent care. Hearing back from our patients is one way we can continue to improve our services. Please take a few minutes to complete the written survey that you may receive in the mail after your visit with us. Thank you!             Your Updated Medication List - Protect others around you: Learn how to safely use, store and throw away your medicines at www.disposemymeds.org.          This list is accurate as of: 8/3/17  4:21 PM.  Always use your most recent med list.                   Brand Name Dispense Instructions for use Diagnosis    multivitamin, therapeutic with minerals Tabs tablet      Take 1 tablet by mouth daily

## 2017-08-03 NOTE — NURSING NOTE
Saw Shantanu Martinez in clinic during kidney donor evaluation.  Has offered to be donor to his friend Lisbet Carrasquillo.  Today we obtained specimen to do further compatibility testing.  He is ABO compatible with the intended recipient.  He has stated interest in entering the KidneyPaired exchange program if it is determined that they are incompatible.    Discussed surgery hospitalization and recovery.    Knows when and how to obtain evaluation results and the process for scheduling surgery.  Has received and reviewed the donor education materials including donor DVD.  Signed consent for donor evaluation.  Reviewed SRTR Data sheet.  Patient was [place] the [place] He has not traveled outside the [place]  Requested routine cancer screening tests: n/a    Time spent 20 minutes.    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    I reviewed details pertaining to the Paired Exchange programs.       1. National Kidney Registry    2. [place] for Paired donation    3. [facility] D Program    4. Internal Exchange at the [facility]    Matching Procedure and Logistics    Reviewed that donors and candidates do not choose their match and that they may decline a match. Explained examples of potential chain/swap scenarios and demonstrated how more than one candidate can receive a transplant in these exchanges.   Reviewed that the D waiting time is unknown, the intended recipient's antibody panel and reviewed their ABO match power.   Frequent lab draws are necessary in the D programs. APD and NKR will send a kit when we initiate the listing to store the blood by freezing the sample and use it for exploratory crossmatches. Once a match offer is made, a fresh blood sample will be needed for the final confirmatory compatibility testing, as well as infectious disease testing.  I reviewed billing policy for the donor evaluation and for  the KPD program.  Bridging  KPD programs may need to have the donor wait a period of time after the recipient receives their kidney, in order to determine matching and logistics for the next cluster of a chain. If donor consents to be a bridge donor, the time waiting for the swap cluster to take place is unknown. The donor stated that they will read the consent and concider whether or no he is interested being a bridge donor if needed. Further blood work may be needed if they are waiting to bridge one year after the initial evaluation.  Unexpected Outcome  I discussed possibility of an unexpected event on the day of transplant. I explained how their donated kidney would be backed up here locally as well as in the destination city in the event that the matched recipient is unable to receive the donated kidney.   I reviewed the KPD programs remedy for failed KPD exchanges, and the remedy does not include any additional priority for the paired candidate on the  donor waiting list. The outcome of the matched recipient may be different from the intended recipient's outcome.  Shipping  The kidney's are transported to the matched recipient's in the exchanges via an approved  service to the airport and then flown fixed wing to the intended destination.GPS devices are used in all the NKR exchanges for close monitoring. Risk included in shipping include damage or loss related to unforseen situations; car crash, airplane crash, terrorist events, etc.   Communication  Donors can communicate with the recipient by giving all correspondence to the Transplant Coordinator, omitting all personal identifiers. The Transplant Coordinator will review and deliver to the recipients Coordinator.  Rights  Donors have a right to withdraw from participation in the KPD program at any time.     Donor has taken the KPD Consent packet home with him to read and consider while the compatibility is being determined.      Questions  answered.      Living Kidney Donor Consent per OPTN Policy for Transplant Coordinators     Written assurance has been obtained from the patient that the donor:   1) Is willing to donate  2) Is free from inducement and coercion  3) Has been informed that the donor may decline to donate at any time  4) Has been informed that transplant centers must:   A) Offer donors an opportunity to discontinue the donor consent or evaluation process in a way that is protected and confidential  B) Provide an independent donor advocate (CE) to assist the potential donor during this process     The following was presented in a language in which donor is able to engage in meaningful dialogue and was reviewed and discussed with the patient by the transplant coordinator and the physician.      The following has been provided:   Education and instruction about all phases of the living donation process includin) Consent  2) The donor must undergo medical and psychosocial evaluations  3) Disclosure that the recovery hospital will take all reasonable precautions to provide confidentiality for the donor/recipient  4) Disclosure that it is a federal crime for any person to knowingly acquire, obtain or otherwise transfer any human organ for valuable consideration  5) The transplant hospital may refuse the potential donor, and the donor could be evaluated by another transplant program with different selection criteria  6) Data from the most recent SRTR center-specific reports:   A) National 1-year patient and graft survival rates  B) Hospital s 1-year patient and graft survival rates  C) Notification about all CMS outcome requirements not being met by the recovery and recipient s hospitals     The following has been provided:   1) Education about expected post-donation kidney function and how chronic kidney disease and end-stage renal disease might potentially impact the donor in the future to include:  A) On average, donors will have  25-35% permanent loss of kidney function at donation  B) Baseline risk of End Stage Renal Disease (ESRD) does not exceed that of members of general population with same demographic profile  C) Donor risks must be interpreted in light of known epidemiology of both Chronic Kidney Disease (CKD) or ESRD  D) CKD generally develops in midlife (40-50 years old)  E) ESRD generally develops after age 60  F) Medical evaluation of young potential donor cannot predict lifetime risk  2) Donors may be at higher risk for CKD if they sustain damage to the remaining kidney. 3) Development of CKD and progression to ESRD may be more rapid with only 1 kidney  4) Dialysis is required when reaching ESRD  5) Current practice prioritizes prior living kidney donors who became kidney transplant candidates  6) Alternate procedures or courses of treatment for the recipient, including  donor transplant  A) A  donor kidney may become available for the recipient before donor evaluation is complete or transplant occurs  B) Any transplant candidate may have risk factors for increased morbidity or mortality that are not disclosed to the potential donor  7) The patient will receive a thorough medical and psychosocial evaluation  8) Health information obtained during the evaluation is subject to the same regulations as all records and could reveal conditions that must be reported to local, state, or federal public health authorities  9) The [facility] [place], is required to report living donor follow up information at 6, 12, and 24 months  10) Potential donors must commit to post operative follow up testing coordinated by the [facility]  11) Any infectious disease or malignancy pertinent to acute recipient care discovered during the potential donor s first two years of follow up care:  A) Will be disclosed to the donor  B) May need to be reported to local, state or federal public health authorities  C) Will be  disclosed to their recipient s transplant center, and  D) Will be reported through the OPTN Improving Patient Safety Portal     Disclosure has been provided that these risks may be transient or permanent & include but are not limited to potential medical or surgical risks:  Death  Scars, pain, fatigue, and other consequences typical of any surgical procedure  Decreased kidney function  Abdominal or bowel symptoms such as bloating and nausea, and developing bowel obstruction  Kidney failure and the need for dialysis or kidney transplant for the donor  Impact of obesity, hypertension or other donor-specific medical conditions on morbidity and mortality of the potential donor.    Questions answered.  Cris Chacko RN  Living Donor Coordinator  08/03/2017 4:27 PM

## 2017-08-03 NOTE — PROGRESS NOTES
Assessment and Plan:  1. Prospective kidney transplant donor with no issues that need to be addressed prior to donation. Patient's blood pressure is acceptable at this visit, kidney function appears to be acceptable with Iohexol pending, and urinalysis is bland.    I spent 45 minutes with this patient of which > 25 minutes was spent face to face counseling regarding the risk, benefit and alternatives of being a living kidney donor. All questions were answered.     Discussed the risks of donating a kidney, including the surgical risk and the possible risks of living with one kidney.    Education about expected post-donation kidney function and how chronic kidney disease (CKD) and end stage kidney disease (ESKD) might potentially impact the donor in the future, include, but not limited to:       - On average, donors will have 25-35% permanent loss of kidney function at donation.       - Baseline risk of ESKD may slightly exceed that of members of the general population with the same demographic profile.       - Donor risks must be interpreted in light of known epidemiology of both CKD or ESKD, such as that CKD generally develops in midlife (40-50 years old) and ESKD generally develops after age 60.       - Medical evaluation of young potential donors cannot predict lifetime risk of CKD or ESKD.       - Donors may be at higher risk for CKD if they sustain damage to the remaining kidney.       - Development of CKD and progression of ESKD may be more rapid with only 1 kidney.       - Some type of kidney replacement therapy, either kidney transplant or dialysis, is required when reaching ESKD.    Potential medical or surgical risks include, but not limited to:       - Death.       - Scars, pain, fatigue, and other consequences typical of any surgical procedure.       - Decreased kidney function.       - Abdominal or bowel symptoms, such as bloating and nausea, and developing bowel obstruction.       - Kidney failure  (ESKD) and the need for a kidney transplant or dialysis for the donor.       - Impact of obesity, hypertension, or other donor-specific medical conditions on morbidity and mortality of the potential donor.    Patients overall evaluation will be discussed with the transplant team and a final recommendation on the patients' suitability to be a kidney transplant donor will be made at that time.    Consult:  Shantanu Martinez was seen in consultation at the request of Dr. Osbaldo Hooker for evaluation as a potential kidney transplant donor.    Reason for Visit:  Shantanu Martinez is a 27 year old male who presents for a kidney donor evaluation.  Patient would like to donate to his friend's fiance.    HPI:    Patient is here for the discussion of risk, benefit, alternative of being a living kidney donor.     He has a history of inguinal hernia repair. No problems with anesthesia of bleeding. 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 No residual problems from this.     No medications. He takes OTC multivite. No herbal medications. Intermittent OTC NSAIDS, but no daily use.     Allergies to Amoxicillin and demerol - hives    No known family hx of kidney disease.     No cigs, social etoh. No illicits.     Today, he feels good.     No cp, sob, no n/v, no constipation or diarrhea. No f/s/c. No weight gain or loss.          Kidney Disease Hx:       h/o Kidney Problems: No  Family h/o Genetic Kidney Disease: No       h/o HTN: No    Usual BP: 100/65       h/o Protein in Urine: No  h/o Blood in Urine: No       h/o Kidney Stones: No  h/o Kidney Injury: No       h/o Recurrent UTI: No  h/o Genitourinary Problems: No       h/o Chronic NSAID Use: No         Other Medical Hx:       h/o DM: No   h/o Gestational DM: Not applicable       h/o Preeclampsia: Not applicable          h/o Gastrointestinal, Pancreas or Liver Problems: No       h/o Lung or Heart Problems: No       h/o Hematologic Problems: No  h/o Bleeding or Clotting  Problems: No       h/o Cancer: No       h/o Infection Problems: No         Skin Cancer Risk:       h/o more than 50 moles: No       h/o extensive sun exposure: Yes        h/o melanoma: No       Family h/o melanoma: No         Mental Health Assessment:       h/o Depression: No       h/o Psychiatric Illness: No       h/o Suicidal Attempt(s): No    ROS:   A comprehensive review of systems was obtained and negative, except as noted in the HPI or PMH.    PMH:   History was taken from the patient as noted below.  Past Medical History:   Diagnosis Date     Asthma     H/O as teen     Heart murmur        PSH:   Past Surgical History:   Procedure Laterality Date     HERNIORRHAPHY INGUINAL  8/12/2013    Procedure: HERNIORRHAPHY INGUINAL;  Right Inguinal Hernia Repair;  Surgeon: Triston Sandhu MD;  Location: [facility] OR     HYDROCELECTOMY INGUINAL  8/12/2013    Procedure: HYDROCELECTOMY INGUINAL;  RIGHT HYDROCELECTOMY;  Surgeon: Triston Sandhu MD;  Location: [facility] OR     Personal or family history of anesthesia problems: No    Family Hx:   No family history on file.       Specific Family Hx:       FH of DM: No       FH of CAD: No  FH of HTN: No       FH of Cancer: No  FH of Kidney Cancer: No    Personal Hx:   Social History     Social History     Marital status: Single     Spouse name: N/A     Number of children: N/A     Years of education: N/A     Occupational History     Not on file.     Social History Main Topics     Smoking status: Never Smoker     Smokeless tobacco: Never Used     Alcohol use No     Drug use: No     Sexual activity: Not on file     Other Topics Concern     Not on file     Social History Narrative          Specific Social Hx:       Health Insurance Status: Yes       Employment Status: Full time  Occupation:                        Living Arrangements: lives with their spouse       Social Support: Yes       Presence of increased risk for disease transmission behaviors as defined by PHS  "guidelines: No        Allergies:  Allergies   Allergen Reactions     Amoxicillin      Demerol        Medications:  Prior to Admission medications    Medication Sig Start Date End Date Taking? Authorizing Provider   multivitamin, therapeutic with minerals (THERA-VIT-M) TABS Take 1 tablet by mouth daily    Reported, Patient       Vitals:  Vital Signs 8/3/2017 8/3/2017 8/3/2017   Systolic - 118 116   Diastolic - 90 75   Pulse - 64 -   Temperature - 97.5 -   Respirations - - -   Weight (LB) - 140 lb -   Height - 5' 7\" -   BMI (Calculated) - 21.97 -   Pain 0 - -   O2 - 98 -     Exam:   GENERAL APPEARANCE: alert and no distress  HENT: mouth without ulcers or lesions  LYMPHATICS: no cervical or supraclavicular nodes  RESP: lungs clear to auscultation - no rales, rhonchi or wheezes  CV: regular rhythm, normal rate, no rub, no murmur  EDEMA: no LE edema bilaterally  ABDOMEN: soft, nondistended, nontender, bowel sounds normal  MS: extremities normal - no gross deformities noted, no evidence of inflammation in joints, no muscle tenderness  SKIN: no rash    Results:   Labs and imaging were ordered for this visit and reviewed by me.  Recent Results (from the past 336 hour(s))   Routine UA with microscopic    Collection Time: 08/03/17  9:02 AM   Result Value Ref Range    Color Urine Yellow     Appearance Urine Clear     Glucose Urine Negative NEG mg/dL    Bilirubin Urine Negative NEG    Ketones Urine Negative NEG mg/dL    Specific Gravity Urine 1.025 1.003 - 1.035    Blood Urine Negative NEG    pH Urine 6.0 5.0 - 7.0 pH    Protein Albumin Urine Negative NEG mg/dL    Urobilinogen mg/dL 2.0 0.0 - 2.0 mg/dL    Nitrite Urine Negative NEG    Leukocyte Esterase Urine Negative NEG    Source Midstream Urine     WBC Urine <1 0 - 2 /HPF    RBC Urine 0 0 - 2 /HPF    Squamous Epithelial /HPF Urine <1 0 - 1 /HPF    Mucous Urine Present (A) NEG /LPF   Microalbumin quantitative random urine    Collection Time: 08/03/17  9:02 AM   Result Value " Ref Range    Creatinine Urine 274 mg/dL    Albumin Urine mg/L 7 mg/L    Albumin Urine mg/g Cr 2.66 0 - 17 mg/g Cr   Protein  random urine    Collection Time: 08/03/17  9:02 AM   Result Value Ref Range    Protein Random Urine 0.13 g/L    Protein Total Urine g/gr Creatinine 0.05 0 - 0.2 g/g Cr   ABO/Rh type and screen    Collection Time: 08/03/17  9:08 AM   Result Value Ref Range    ABO O     RH(D)  Neg     Antibody Screen Neg     Test Valid Only At       [facility]    Specimen Expires 08/06/2017    Hepatitis B core antibody    Collection Time: 08/03/17  9:08 AM   Result Value Ref Range    Hepatitis B Core Ashley Nonreactive NR   Hepatitis B Surface Antibody    Collection Time: 08/03/17  9:08 AM   Result Value Ref Range    Hepatitis B Surface Antibody (H) <8.00 m[IU]/mL     >1000.00  Reactive, Patient is considered to be immune to infection with hepatitis B when   the value is greater than or equal to 12.0 m[IU]/mL.     Hepatitis B surface antigen    Collection Time: 08/03/17  9:08 AM   Result Value Ref Range    Hep B Surface Agn Nonreactive NR   Hepatitis C antibody    Collection Time: 08/03/17  9:08 AM   Result Value Ref Range    Hepatitis C Antibody  NR     Nonreactive   Assay performance characteristics have not been established for newborns,   infants, and children     HIV Antigen Antibody Combo Pretransplant    Collection Time: 08/03/17  9:08 AM   Result Value Ref Range    HIV Antigen Antibody Combo Pretransplant  NR     Nonreactive   HIV-1 p24 Ag & HIV-1/HIV-2 Ab Not Detected     Lipid Profile    Collection Time: 08/03/17  9:08 AM   Result Value Ref Range    Cholesterol 127 <200 mg/dL    Triglycerides 36 <150 mg/dL    HDL Cholesterol 64 >39 mg/dL    LDL Cholesterol Calculated 55 <100 mg/dL    Non HDL Cholesterol 62 <130 mg/dL   Comprehensive metabolic panel    Collection Time: 08/03/17  9:08 AM   Result Value Ref Range    Sodium 142 133 - 144 mmol/L    Potassium 4.0  3.4 - 5.3 mmol/L    Chloride 105 94 - 109 mmol/L    Carbon Dioxide 28 20 - 32 mmol/L    Anion Gap 8 3 - 14 mmol/L    Glucose 93 70 - 99 mg/dL    Urea Nitrogen 16 7 - 30 mg/dL    Creatinine 1.10 0.66 - 1.25 mg/dL    GFR Estimate 81 >60 mL/min/1.7m2    GFR Estimate If Black >90   GFR Calc   >60 mL/min/1.7m2    Calcium 9.1 8.5 - 10.1 mg/dL    Bilirubin Total 1.3 0.2 - 1.3 mg/dL    Albumin 4.6 3.4 - 5.0 g/dL    Protein Total 7.5 6.8 - 8.8 g/dL    Alkaline Phosphatase 54 40 - 150 U/L    ALT 28 0 - 70 U/L    AST 25 0 - 45 U/L   Phosphorus    Collection Time: 08/03/17  9:08 AM   Result Value Ref Range    Phosphorus 3.8 2.5 - 4.5 mg/dL   Hemoglobin A1c    Collection Time: 08/03/17  9:08 AM   Result Value Ref Range    Hemoglobin A1C 5.0 4.3 - 6.0 %   INR    Collection Time: 08/03/17  9:08 AM   Result Value Ref Range    INR 1.10 0.86 - 1.14   Partial thromboplastin time    Collection Time: 08/03/17  9:08 AM   Result Value Ref Range    PTT 30 22 - 37 sec   CBC with platelets    Collection Time: 08/03/17  9:08 AM   Result Value Ref Range    WBC 4.0 4.0 - 11.0 10e9/L    RBC Count 5.09 4.4 - 5.9 10e12/L    Hemoglobin 16.2 13.3 - 17.7 g/dL    Hematocrit 45.0 40.0 - 53.0 %    MCV 88 78 - 100 fl    MCH 31.8 26.5 - 33.0 pg    MCHC 36.0 31.5 - 36.5 g/dL    RDW 11.7 10.0 - 15.0 %    Platelet Count 183 150 - 450 10e9/L   Uric acid    Collection Time: 08/03/17  9:08 AM   Result Value Ref Range    Uric Acid 5.2 3.5 - 7.2 mg/dL   EKG 12-lead complete w/read - Clinics    Collection Time: 08/03/17 11:57 AM   Result Value Ref Range    Interpretation ECG Click View Image link to view waveform and result

## 2017-08-03 NOTE — PROGRESS NOTES
Psychosocial Evaluation  Living Organ Donation per OPTN Policy 14.1.A  Organ Type: unrelated, kidney  Presenting Information:  Mr. Shantanu Martinez presents to the [facility] to complete a psychosocial evaluation since he is interested in becoming a kidney donor to a friend, Ms. Lisbet Carrasquillo, age 26.  Mr. Martinez is a 27 year old, single, white (of Western  ancestry), male.  He is a [place] Citizen.   PERSONAL BACKGROUND:  Current Living Situation: Mr. Martinez lives with his fiance and their 3 children in a single family home in [place], [place].  [place] north of the [facility].    Education/Employment/Financial Situation: Mr. Martinez completed high school.  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  This is full time, seasonal work.  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  Mr. Martinez has health care insurance.  Mr. Martinez would benefit from the NLDAC program, and I will send him an application via e-mail.  He may have some lost wages, but if he does the donation during the winter, when he is not fighting fires, that would be best.  Mr. Martinez was counseled about the living donor keith that we can use to assist with some household living expenses.  I have asked the recipient's  to obtain NLDAC application.    Family History: Mr. Martinez is single.  He is engaged to be  to Lashawn Joyce, his fiance, in 16 days.  Mr. Martinez and Ms. Joyce have a 9 month old daughter together.  Mr. Martinez also has full custody of his 6 year old daughter and 5 year old son from a previous relationship.  His 6 year old daughter has an intestinal disease where she was born without much of her small intestine.  Mr. Martinez reports that they went through many months of intestinal rehabilitation treatment in  "[place], and now will be transferring care to the [facility] system for her ongoing needs.  Mr. Martinez's mother lives in [place], [place], and his father, with whom he does not have contact, lives somewhere in the [facility].  His ramona's parents live just down the street from them.    General Health: Mr. Martinez considers himself to be in excellent general health.  He does not have a health care directive at this time.    Mental Health: Denies any past or present treatment for mental health issues.  Denies any need to see a counselor or therapist.  Denies any past suicidal ideation, plans, or past attempts.  Denies any use of psychotropic medications.  Denies any past history of hospitalization for psychiatric illness.    Alcohol and Drug Use/Abuse/Dependency: Denies any past history of abuse or dependency on alcohol or illicit drugs. Denies any current use of street drugs, including marijuana.  Denies any past history of negative consequences of use of alcohol or drugs such as a DUI, relationship problems, or problems with work or home life.   Mr. Martinez estimates that he consumes about 1 drink per month.    Cigarette Use: Mr. Martinez does not smoke cigarettes.    Legal: Mr. Martinez denies any legal issues.    Coping Strategies/Support System: Mr. Martinez has social support from his fiance, his mother, his mother's boyfriend, and his in-laws.    DONOR SPECIFIC INFORMATION:  Relationship to Recipient: Mr. Martinez wants to donate a kidney to his friend, Foreign Flood, age 26.  They are acquaintances.  His fiance, Lashawn, is friends with Lisbet.  Mr. Martinez does not know the details of Lisbet's ESRD.  He does know that she is on dialysis 3 times per week.    Decision Process/Motivation to Donate: Mr. Martinez reports that he is motivated to donate to Lisbet to help improve the quality of her life.  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  He denies feeling any pressure or coercion.  He denies being offered any form " of payment to be a donor.    PREPARATION FOR DONATION, RECOVERY, AND POTENTIAL SHORT-LONG-TERM OUTCOMES:  Understanding of the Living Donation Process:   We discussed the role of the donor  and Independent Donor Advocate.  Short and long term medical and psychosocial risks to both, donor and recipient were reviewed and he appears to understand these risks.  High risk behaviors as defined by [place] Public Health Services (PHS) 2013 that have potential to increase risk of disease transmission were reviewed and he denies any high risk behaviors. Post surgical restrictions (2 weeks no driving, 6 weeks no lifting over 10 lbs) were reviewed and he appears capable of adhering to the post surgical requirements. The need for a caregiver was discussed and he has social support to assist him with his post surgery care needs.  The risk of poor psychosocial outcome including problems with body image, post-surgery depression or anxiety, or feelings of emotional distress or bereavement if recipient experiences any recurrent disease, poor outcome or death was reviewed.  Additionally, potential financial implications, including the risk of having difficulty obtaining health care insurance, life insurance, disability insurance, or long term care insurance were reviewed, as were available donor grants to assist with donor related expenses.      We also discussed some unique issues that arise with paired kidney donation, which include the uncertainty of the timing and the importance of having a employment situation and support system that is able to provide sustained support and flexibility.    Mr. Martinez appears capable of understanding this information and making an informed medical decision.    Impressions/Recommendations:   Mr. Shantanu Martinez  is highly motivated to donate kidney  to a friend, Ms. Lisbet Carrasquillo, age 26.  His decision to donate is free of inducement, coercion, or other undue pressure.   His housing, finances  and employment are stable.  No current/active mental health or chemical abuse issues were identified.  The need for a caregiver was reviewed and he is able to identify a plan to meet his post operative care needs.  Mr. Martinez appears capable of making an informed medical decision.  No psychosocial contraindications to living organ donation were identified and  I support Mr. Martinez s desire to donate a kidney to Lisbet Foreign.         Contact Information:   LEELEE Pardo, [facility]  Clinical  and Independent Donor Advocate  [facility]  Pager:  [phone]  Direct:  [phone]    Time Spent: 60 minutes      Living Kidney Donor Consent per OPTN Policy 14.3.A for Independent Living Donor Advocate (ALYSON)    Written assurance has been obtained from the potential donor that he/she:   Is willing to donate  Is free from inducement and coercion  Has been informed that the he/she may decline to donate at any time  Has been informed that transplant centers must:   A) Offer donors an opportunity to discontinue the donor consent or evaluation process in a way that is protected and confidential  B) Provide an independent living donor advocate (ALYSON) to assist the potential donor during this process    The following was presented to the potential donor in a language in which the potential donor is able to engage in meaningful dialogue:   Education and instruction about all phases of the living donation process including:   Consent  Medical and psychosocial evaluations  Pre and post operative care  Required post operative follow up  Disclosure that the recovery hospital will take all reasonable precautions to provide confidentiality for the donor/recipient  Disclosure that it is a federal crime for any person to knowingly acquire, obtain or otherwise transfer any human organ for valuable consideration  Disclosure that the recovery hospital must provide an independent living  donor advocate (ALYSON)  Disclosure that health information obtained during the evaluation is subject to the same regulations as all records and could reveal conditions that must be reported to local, state, or federal public health authorities  Disclosure that the [facility] is required to report living donor follow up information at 6 months, 1 year, and 2 years, and that the potential donor must commit to post operative follow up testing coordinated by the [facility]    Disclosure has been provided that these risks may be transient or permanent & include but are not limited to:  Potential psychosocial risks:  Problems with body image  Post-surgery depression or anxiety  Feelings of emotional distress or bereavement if recipient experiences any recurrent disease or in the event of the recipient s death  Impact of donation on the donor s lifestyle    Potential financial impacts:  Personal expenses of travel, housing, , lost wages related to donation might not be reimbursed. However, resources may be available to defray some donation-related expenses   Need for life-long follow up at the donor s expense  Loss of employment or income  Negative impact on the ability to obtain future employment  Negative impact on the ability to obtain, maintain, or afford health, disability, and life insurance  Future health problems experienced by living donors following donation may not be covered by the recipient s insurance    Contact Information:  LEELEE Pardo, [facility]  Clinical  and Independent Donor Advocate  [facility]  Pager:  [phone]  Direct:  [phone]      Time Spent: 60 minutes

## 2017-08-03 NOTE — MR AVS SNAPSHOT
After Visit Summary   8/3/2017    Shantanu Martinez    MRN: [id number]           Patient Information     Date Of Birth          1990        Visit Information        Provider Department      8/3/2017 10:00 AM Cheryl Moss [facility]        Today's Diagnoses     Transplant donor evaluation    -  1       Follow-ups after your visit        Your next 10 appointments already scheduled     Aug 03, 2017  3:00 PM CDT   (Arrive by 2:45 PM)   XR CHEST 2 VIEWS with UCXR1   [facility] ([facility])    [address] [address]   [phone]           Please bring a list of your current medicines to your exam. (Include vitamins, minerals and over-thecounter medicines.) Leave your valuables at home.  Tell your doctor if there is a chance you may be pregnant.  You do not need to do anything special for this exam.            Aug 03, 2017  3:20 PM CDT   (Arrive by 3:05 PM)   CT RENAL ANGIO with UCCT1   [facility] CT ([facility])    [address] [address] [phone]           Please bring any scans or X-rays taken at other hospitals, if similar tests were done. Also bring a list of your medicines, including vitamins, minerals and over-the-counter drugs. It is safest to leave personal items at home.  Be sure to tell your doctor:   If you have any allergies.   If there s any chance you are pregnant.   If you are breastfeeding.   If you have any special needs.  You will have contrast for this exam. To prepare:   Do not eat or drink for 2 hours before your exam. If you need to take medicine, you may take it with small sips of water. (We may ask you to take liquid medicine as well.)   The day before your exam, drink extra fluids at least six 8-ounce glasses (unless your doctor tells you to restrict your fluids).  Patients over 70 or  "patients with diabetes or kidney problems:   If you haven t had a blood test (creatinine test) within the last 30 days, go to your clinic or Diagnostic Imaging Department for this test.  If you have diabetes:   If your kidney function is normal, continue taking your metformin (Avandamet, Glucophage, Glucovance, Metaglip) on the day of your exam.   If your kidney function is abnormal, wait 48 hours before restarting this medicine.  Please wear loose clothing, such as a sweat suit or jogging clothes. Avoid snaps, zippers and other metal. We may ask you to undress and put on a hospital gown.  If you have any questions, please call the Imaging Department where you will have your exam.              Who to contact     If you have questions or need follow up information about today's clinic visit or your schedule please contact [facility] directly at [phone].  Normal or non-critical lab and imaging results will be communicated to you by [employer], letter or phone within 4 business days after the clinic has received the results. If you do not hear from us within 7 days, please contact the clinic through [employer] or phone. If you have a critical or abnormal lab result, we will notify you by phone as soon as possible.  Submit refill requests through [employer] or call your pharmacy and they will forward the refill request to us. Please allow 3 business days for your refill to be completed.          Additional Information About Your Visit        [employer] Information     [employer] lets you send messages to your doctor, view your test results, renew your prescriptions, schedule appointments and more. To sign up, go to [email or web address] . Click on \"Log in\" on the left side of the screen, which will take you to the Welcome page. Then click on \"Sign up Now\" on the right side of the page.     You will be asked to enter the access code listed below, as well as some personal information. Please follow the " directions to create your username and password.     Your access code is: [email or web address]  Expires: 2017  2:19 PM     Your access code will  in 90 days. If you need help or a new code, please call your [facility] clinic or [phone].        Care EveryWhere ID     This is your Care EveryWhere ID. This could be used by other organizations to access your [facility] medical records  [id number]         Blood Pressure from Last 3 Encounters:   17 116/75   17 113/74   17 118/90    Weight from Last 3 Encounters:   17 63.5 kg (140 lb)   13 59 kg (130 lb)   13 59.9 kg (132 lb)              Today, you had the following     No orders found for display       Primary Care Provider Office Phone # Fax #    Michael Zelaya [phone]       [facility] [address] [address]        Equal Access to Services     [facility]: Hadii aad ku hadasho Soomaali, waaxda luqadaha, qaybta kaalmada adeegyada, waxay idiin haychristin ragini oscar . So [facility] [phone].    ATENCIÓN: Si habla español, tiene a biggs disposición servicios gratuitos de asistencia lingüística. Llame al [phone].    We comply with applicable federal civil rights laws and [place] laws. We do not discriminate on the basis of race, color, national origin, age, disability sex, sexual orientation or gender identity.            Thank you!     Thank you for choosing [facility]  for your care. Our goal is always to provide you with excellent care. Hearing back from our patients is one way we can continue to improve our services. Please take a few minutes to complete the written survey that you may receive in the mail after your visit with us. Thank you!             Your Updated Medication List - Protect others around you: Learn how to safely use, store and throw away your medicines at www.disposemymeds.org.          This list is accurate as of: 8/3/17  2:49 PM.   Always use your most recent med list.                   Brand Name Dispense Instructions for use Diagnosis    multivitamin, therapeutic with minerals Tabs tablet      Take 1 tablet by mouth daily

## 2017-08-03 NOTE — LETTER
8/3/2017       RE: Shantanu Martinez  [address] [facility] [address]     Dear Colleague,    Thank you for referring your patient, Shantanu Martinez, to the [facility] at [facility]. Please see a copy of my visit note below.    Assessment and Plan:  1. Prospective kidney transplant donor with no issues that need to be addressed prior to donation. Patient's blood pressure is acceptable at this visit, kidney function appears to be acceptable with Iohexol pending, and urinalysis is bland.    I spent 45 minutes with this patient of which > 25 minutes was spent face to face counseling regarding the risk, benefit and alternatives of being a living kidney donor. All questions were answered.     Discussed the risks of donating a kidney, including the surgical risk and the possible risks of living with one kidney.    Education about expected post-donation kidney function and how chronic kidney disease (CKD) and end stage kidney disease (ESKD) might potentially impact the donor in the future, include, but not limited to:       - On average, donors will have 25-35% permanent loss of kidney function at donation.       - Baseline risk of ESKD may slightly exceed that of members of the general population with the same demographic profile.       - Donor risks must be interpreted in light of known epidemiology of both CKD or ESKD, such as that CKD generally develops in midlife (40-50 years old) and ESKD generally develops after age 60.       - Medical evaluation of young potential donors cannot predict lifetime risk of CKD or ESKD.       - Donors may be at higher risk for CKD if they sustain damage to the remaining kidney.       - Development of CKD and progression of ESKD may be more rapid with only 1 kidney.       - Some type of kidney replacement therapy, either kidney transplant or dialysis, is required when reaching ESKD.    Potential medical or surgical risks include, but not limited  to:       - Death.       - Scars, pain, fatigue, and other consequences typical of any surgical procedure.       - Decreased kidney function.       - Abdominal or bowel symptoms, such as bloating and nausea, and developing bowel obstruction.       - Kidney failure (ESKD) and the need for a kidney transplant or dialysis for the donor.       - Impact of obesity, hypertension, or other donor-specific medical conditions on morbidity and mortality of the potential donor.    Patients overall evaluation will be discussed with the transplant team and a final recommendation on the patients' suitability to be a kidney transplant donor will be made at that time.    Consult:  Shantanu Martinez was seen in consultation at the request of Dr. Osbaldo Hooker for evaluation as a potential kidney transplant donor.    Reason for Visit:  Shantanu Martinez is a 27 year old male who presents for a kidney donor evaluation.  Patient would like to donate to his friend's fiance.    HPI:    Patient is here for the discussion of risk, benefit, alternative of being a living kidney donor.     He has a history of inguinal hernia repair. No problems with anesthesia of bleeding. 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 No residual problems from this.     No medications. He takes OTC multivite. No herbal medications. Intermittent OTC NSAIDS, but no daily use.     Allergies to Amoxicillin and demerol - hives    No known family hx of kidney disease.     No cigs, social etoh. No illicits.     Today, he feels good.     No cp, sob, no n/v, no constipation or diarrhea. No f/s/c. No weight gain or loss.          Kidney Disease Hx:       h/o Kidney Problems: No  Family h/o Genetic Kidney Disease: No       h/o HTN: No    Usual BP: 100/65       h/o Protein in Urine: No  h/o Blood in Urine: No       h/o Kidney Stones: No  h/o Kidney Injury: No       h/o Recurrent UTI: No  h/o Genitourinary Problems: No       h/o Chronic NSAID Use: No         Other  Medical Hx:       h/o DM: No   h/o Gestational DM: Not applicable       h/o Preeclampsia: Not applicable          h/o Gastrointestinal, Pancreas or Liver Problems: No       h/o Lung or Heart Problems: No       h/o Hematologic Problems: No  h/o Bleeding or Clotting Problems: No       h/o Cancer: No       h/o Infection Problems: No         Skin Cancer Risk:       h/o more than 50 moles: No       h/o extensive sun exposure: Yes        h/o melanoma: No       Family h/o melanoma: No         Mental Health Assessment:       h/o Depression: No       h/o Psychiatric Illness: No       h/o Suicidal Attempt(s): No    ROS:   A comprehensive review of systems was obtained and negative, except as noted in the HPI or PMH.    PMH:   History was taken from the patient as noted below.  Past Medical History:   Diagnosis Date     Asthma     H/O as teen     Heart murmur        PSH:   Past Surgical History:   Procedure Laterality Date     HERNIORRHAPHY INGUINAL  8/12/2013    Procedure: HERNIORRHAPHY INGUINAL;  Right Inguinal Hernia Repair;  Surgeon: Triston Sandhu MD;  Location: [facility] OR     HYDROCELECTOMY INGUINAL  8/12/2013    Procedure: HYDROCELECTOMY INGUINAL;  RIGHT HYDROCELECTOMY;  Surgeon: Triston Sandhu MD;  Location: [facility] OR     Personal or family history of anesthesia problems: No    Family Hx:   No family history on file.       Specific Family Hx:       FH of DM: No       FH of CAD: No  FH of HTN: No       FH of Cancer: No  FH of Kidney Cancer: No    Personal Hx:   Social History     Social History     Marital status: Single     Spouse name: N/A     Number of children: N/A     Years of education: N/A     Occupational History     Not on file.     Social History Main Topics     Smoking status: Never Smoker     Smokeless tobacco: Never Used     Alcohol use No     Drug use: No     Sexual activity: Not on file     Other Topics Concern     Not on file     Social History Narrative          Specific Social Hx:       Health  "Insurance Status: Yes       Employment Status: Full time  Occupation:                        Living Arrangements: lives with their spouse       Social Support: Yes       Presence of increased risk for disease transmission behaviors as defined by PHS guidelines: No        Allergies:  Allergies   Allergen Reactions     Amoxicillin      Demerol        Medications:  Prior to Admission medications    Medication Sig Start Date End Date Taking? Authorizing Provider   multivitamin, therapeutic with minerals (THERA-VIT-M) TABS Take 1 tablet by mouth daily    Reported, Patient       Vitals:  Vital Signs 8/3/2017 8/3/2017 8/3/2017   Systolic - 118 116   Diastolic - 90 75   Pulse - 64 -   Temperature - 97.5 -   Respirations - - -   Weight (LB) - 140 lb -   Height - 5' 7\" -   BMI (Calculated) - 21.97 -   Pain 0 - -   O2 - 98 -     Exam:   GENERAL APPEARANCE: alert and no distress  HENT: mouth without ulcers or lesions  LYMPHATICS: no cervical or supraclavicular nodes  RESP: lungs clear to auscultation - no rales, rhonchi or wheezes  CV: regular rhythm, normal rate, no rub, no murmur  EDEMA: no LE edema bilaterally  ABDOMEN: soft, nondistended, nontender, bowel sounds normal  MS: extremities normal - no gross deformities noted, no evidence of inflammation in joints, no muscle tenderness  SKIN: no rash    Results:   Labs and imaging were ordered for this visit and reviewed by me.  Recent Results (from the past 336 hour(s))   Routine UA with microscopic    Collection Time: 08/03/17  9:02 AM   Result Value Ref Range    Color Urine Yellow     Appearance Urine Clear     Glucose Urine Negative NEG mg/dL    Bilirubin Urine Negative NEG    Ketones Urine Negative NEG mg/dL    Specific Gravity Urine 1.025 1.003 - 1.035    Blood Urine Negative NEG    pH Urine 6.0 5.0 - 7.0 pH    Protein Albumin Urine Negative NEG mg/dL    Urobilinogen mg/dL 2.0 0.0 - 2.0 mg/dL    Nitrite Urine Negative NEG    Leukocyte Esterase Urine Negative " NEG    Source Midstream Urine     WBC Urine <1 0 - 2 /HPF    RBC Urine 0 0 - 2 /HPF    Squamous Epithelial /HPF Urine <1 0 - 1 /HPF    Mucous Urine Present (A) NEG /LPF   Microalbumin quantitative random urine    Collection Time: 08/03/17  9:02 AM   Result Value Ref Range    Creatinine Urine 274 mg/dL    Albumin Urine mg/L 7 mg/L    Albumin Urine mg/g Cr 2.66 0 - 17 mg/g Cr   Protein  random urine    Collection Time: 08/03/17  9:02 AM   Result Value Ref Range    Protein Random Urine 0.13 g/L    Protein Total Urine g/gr Creatinine 0.05 0 - 0.2 g/g Cr   ABO/Rh type and screen    Collection Time: 08/03/17  9:08 AM   Result Value Ref Range    ABO O     RH(D)  Neg     Antibody Screen Neg     Test Valid Only At       [facility]    Specimen Expires 08/06/2017    Hepatitis B core antibody    Collection Time: 08/03/17  9:08 AM   Result Value Ref Range    Hepatitis B Core Ashley Nonreactive NR   Hepatitis B Surface Antibody    Collection Time: 08/03/17  9:08 AM   Result Value Ref Range    Hepatitis B Surface Antibody (H) <8.00 m[IU]/mL     >1000.00  Reactive, Patient is considered to be immune to infection with hepatitis B when   the value is greater than or equal to 12.0 m[IU]/mL.     Hepatitis B surface antigen    Collection Time: 08/03/17  9:08 AM   Result Value Ref Range    Hep B Surface Agn Nonreactive NR   Hepatitis C antibody    Collection Time: 08/03/17  9:08 AM   Result Value Ref Range    Hepatitis C Antibody  NR     Nonreactive   Assay performance characteristics have not been established for newborns,   infants, and children     HIV Antigen Antibody Combo Pretransplant    Collection Time: 08/03/17  9:08 AM   Result Value Ref Range    HIV Antigen Antibody Combo Pretransplant  NR     Nonreactive   HIV-1 p24 Ag & HIV-1/HIV-2 Ab Not Detected     Lipid Profile    Collection Time: 08/03/17  9:08 AM   Result Value Ref Range    Cholesterol 127 <200 mg/dL    Triglycerides 36 <150  mg/dL    HDL Cholesterol 64 >39 mg/dL    LDL Cholesterol Calculated 55 <100 mg/dL    Non HDL Cholesterol 62 <130 mg/dL   Comprehensive metabolic panel    Collection Time: 08/03/17  9:08 AM   Result Value Ref Range    Sodium 142 133 - 144 mmol/L    Potassium 4.0 3.4 - 5.3 mmol/L    Chloride 105 94 - 109 mmol/L    Carbon Dioxide 28 20 - 32 mmol/L    Anion Gap 8 3 - 14 mmol/L    Glucose 93 70 - 99 mg/dL    Urea Nitrogen 16 7 - 30 mg/dL    Creatinine 1.10 0.66 - 1.25 mg/dL    GFR Estimate 81 >60 mL/min/1.7m2    GFR Estimate If Black >90   GFR Calc   >60 mL/min/1.7m2    Calcium 9.1 8.5 - 10.1 mg/dL    Bilirubin Total 1.3 0.2 - 1.3 mg/dL    Albumin 4.6 3.4 - 5.0 g/dL    Protein Total 7.5 6.8 - 8.8 g/dL    Alkaline Phosphatase 54 40 - 150 U/L    ALT 28 0 - 70 U/L    AST 25 0 - 45 U/L   Phosphorus    Collection Time: 08/03/17  9:08 AM   Result Value Ref Range    Phosphorus 3.8 2.5 - 4.5 mg/dL   Hemoglobin A1c    Collection Time: 08/03/17  9:08 AM   Result Value Ref Range    Hemoglobin A1C 5.0 4.3 - 6.0 %   INR    Collection Time: 08/03/17  9:08 AM   Result Value Ref Range    INR 1.10 0.86 - 1.14   Partial thromboplastin time    Collection Time: 08/03/17  9:08 AM   Result Value Ref Range    PTT 30 22 - 37 sec   CBC with platelets    Collection Time: 08/03/17  9:08 AM   Result Value Ref Range    WBC 4.0 4.0 - 11.0 10e9/L    RBC Count 5.09 4.4 - 5.9 10e12/L    Hemoglobin 16.2 13.3 - 17.7 g/dL    Hematocrit 45.0 40.0 - 53.0 %    MCV 88 78 - 100 fl    MCH 31.8 26.5 - 33.0 pg    MCHC 36.0 31.5 - 36.5 g/dL    RDW 11.7 10.0 - 15.0 %    Platelet Count 183 150 - 450 10e9/L   Uric acid    Collection Time: 08/03/17  9:08 AM   Result Value Ref Range    Uric Acid 5.2 3.5 - 7.2 mg/dL   EKG 12-lead complete w/read - Clinics    Collection Time: 08/03/17 11:57 AM   Result Value Ref Range    Interpretation ECG Click View Image link to view waveform and result        Again, thank you for allowing me to participate in the care  of your patient.      Sincerely,    Liu Wright MD

## 2017-08-04 LAB — INTERPRETATION ECG - MUSE: NORMAL

## 2017-08-07 LAB
M TB TUBERC IFN-G BLD QL: NEGATIVE
M TB TUBERC IFN-G/MITOGEN IGNF BLD: 0 IU/ML

## 2017-08-08 LAB
BSA: 1.73 M2
IOHEXOL CL UR+SERPL-VRATE: 105 /1.73 M2
IOHEXOL CL UR+SERPL-VRATE: 105 ML/MIN
IOHEXOL CL UR+SERPL-VRATE: 3.07 MG/DL
IOHEXOL CL UR+SERPL-VRATE: 7.88 MG/DL

## 2017-08-09 ENCOUNTER — COMMITTEE REVIEW (OUTPATIENT)
Dept: TRANSPLANT | Facility: CLINIC | Age: 27
End: 2017-08-09

## 2017-08-09 ENCOUNTER — DOCUMENTATION ONLY (OUTPATIENT)
Dept: TRANSPLANT | Facility: CLINIC | Age: 27
End: 2017-08-09

## 2017-08-09 ENCOUNTER — TELEPHONE (OUTPATIENT)
Dept: TRANSPLANT | Facility: CLINIC | Age: 27
End: 2017-08-09

## 2017-08-09 NOTE — PROGRESS NOTES
Pre-Donation Renal CT angio review:    Kidney size discrepancy (>1.5cm difference): No    Left kidney  Parenchymal abnormality: None  Artery: number= 1, early bifurcation: No 2.4 cm, other concern: None  Vein: number= 1, concern: None  Ureter: 1, abnormality: None    Right kidney  Parenchymal abnormality: None  Artery: number= 1, early bifurcation: No 2.7 cm, other concern: None  Vein: number= 1, concern: None  Ureter: 1, abnormality: None    Other significant findings:  No    Kidney selected for donation: Left    Tariq Eagle MD

## 2017-08-09 NOTE — COMMITTEE REVIEW
Abdominal Committee Review Note     Evaluation Date:   Committee Review Date: 8/9/2017    Organ being evaluated for: Kidney    Transplant Phase:   Transplant Status:     Transplant Coordinator: No matching coordinators  Transplant Surgeon:       Referring Physician: Referred Self    Primary Diagnosis:   Secondary Diagnosis:     Committee Review Members:  Nephrology Liu Wright MD, Martín Peralta MD   Nutrition Mariposa Zafar,    Pharmacy Sumit Lea, [facility]    - Clinical Fariha Warren, [facility], Cheryl Moss, [facility]   Transplant Jacqueline Hennessy, Baljit Carrillo MD, Marcella Clayton, COLTON, Debby Carver, LUCERO, Deyanira Herron LPN, Cris Chacko RN   Transplant Surgery Tariq Eagle MD       Transplant Eligibility: Acceptable Physical Health, Acceptable Mental Health    Committee Review Decision: Approved    Relative Contraindications: None    Absolute Contraindications: None    Committee Chair Baljit Carrillo MD verbally attested to the committee's decision.    Committee Discussion Details: Reviewed clinical data including past surgical history and allergies. Labs are within normal limits. BMI is good at 21. Iohexol is 105. Due to his renal volume discrepancy, the committee would like him to have a split renal function to determine the correct laterality for donation. Chest xray noted rib thinning and will have Dr. Wright from nephrology review.

## 2017-09-26 ENCOUNTER — TELEPHONE (OUTPATIENT)
Dept: TRANSPLANT | Facility: CLINIC | Age: 27
End: 2017-09-26

## 2017-09-26 DIAGNOSIS — Z00.5 EXAMINATION OF POTENTIAL DONOR OF ORGAN AND TISSUE: Primary | ICD-10-CM

## 2017-09-26 NOTE — TELEPHONE ENCOUNTER
I received a return phone call from the patient.  He said he is home now, he had been helping work with the fire crew.  He is available now to do the test.  He said he could go to [place] if needed.  I called the Radiology Dept at [facility] to confirm that they can do the test.  I entered order in EPIC.  I have left another message with the patient with the phone number to call for Radiology scheduling.

## 2017-10-04 ENCOUNTER — HOSPITAL ENCOUNTER (OUTPATIENT)
Dept: NUCLEAR MEDICINE | Facility: HOSPITAL | Age: 27
Setting detail: NUCLEAR MEDICINE
End: 2017-10-04
Attending: INTERNAL MEDICINE
Payer: COMMERCIAL

## 2017-10-04 ENCOUNTER — HOSPITAL ENCOUNTER (OUTPATIENT)
Dept: NUCLEAR MEDICINE | Facility: HOSPITAL | Age: 27
Setting detail: NUCLEAR MEDICINE
Discharge: HOME | End: 2017-10-04
Attending: INTERNAL MEDICINE | Admitting: INTERNAL MEDICINE
Payer: COMMERCIAL

## 2017-10-04 DIAGNOSIS — Z00.5 EXAMINATION OF POTENTIAL DONOR OF ORGAN AND TISSUE: ICD-10-CM

## 2017-10-04 PROCEDURE — A9562 TC99M MERTIATIDE: HCPCS | Performed by: INTERNAL MEDICINE

## 2017-10-04 PROCEDURE — 78707 K FLOW/FUNCT IMAGE W/O DRUG: CPT | Mod: TC

## 2017-10-04 PROCEDURE — 34300033 ZZH RX 343: Performed by: INTERNAL MEDICINE

## 2017-10-04 RX ADMIN — Medication 8 MILLICURIE: at 12:14

## 2017-10-18 ENCOUNTER — DOCUMENTATION ONLY (OUTPATIENT)
Dept: PHARMACY | Facility: CLINIC | Age: 27
End: 2017-10-18

## 2017-10-18 ENCOUNTER — COMMITTEE REVIEW (OUTPATIENT)
Dept: TRANSPLANT | Facility: CLINIC | Age: 27
End: 2017-10-18

## 2017-10-18 NOTE — COMMITTEE REVIEW
Abdominal Committee Review Note     Evaluation Date:   Committee Review Date: 10/18/2017    Organ being evaluated for: Kidney    Transplant Phase:   Transplant Status:     Transplant Coordinator: No matching coordinators  Transplant Surgeon:       Referring Physician: Referred Self    Primary Diagnosis:   Secondary Diagnosis:     Committee Review Members:  Nephrology Martín Peralta MD   Nutrition Mariposa Zafar, RD   Pharmacy Sumit Lea, [facility]    - Clinical Fariha Warren, [facility], Cheryl Moss, [facility]   Transplant Jacqueline Hennessy, Deyanira Herron LPN, Cris Chacko RN, Tariq Eagle MD, Jason Moran MD, Jori Xavier MD       Transplant Eligibility: Acceptable Mental Health, Acceptable Physical Health    Committee Review Decision: Approved    Relative Contraindications: None    Absolute Contraindications: None    Committee Chair Jori Xavier MD verbally attested to the committee's decision.    Committee Discussion Details: Reviewed the split function results.  Kidney function is nearly equal and therefore either kidney could be donated.  The clarification on the Rib notation was cleared by Dr Wright earlier.  Patient is approved to donate.

## 2017-10-18 NOTE — PHARMACY-MEDICATION REGIMEN REVIEW
Pharmacy Living Kidney Donor Medication Evaluation     This patient is a 27 year old male being considered for living kidney donation. As part of the kidney pre-donation patient evaluation, pharmacy has screened this patient's electronic medical record for medication related concerns.    Assessment / Plan    No significant potential medication related issues are expected for this patient post surgery, based on the medical record medication list review.  Pharmacy will continue to participate in this patient's care throughout the surgery course.  Please contact pharmacy with any further medication related questions or concerns.       SUE Obrien.Ph.  [facility]  [phone] pager

## 2017-10-22 ENCOUNTER — DOCUMENTATION ONLY (OUTPATIENT)
Dept: TRANSPLANT | Facility: CLINIC | Age: 27
End: 2017-10-22

## 2017-10-22 NOTE — PROGRESS NOTES
Pre-Donation Renal CT angio review:    Kidney size discrepancy (>1.5cm difference): No    Left kidney  Parenchymal abnormality: None  Artery: number= 1, early bifurcation: No, other concern: None  Vein: number= 1, concern: None  Ureter: 1, abnormality: None    Right kidney  Parenchymal abnormality: None  Artery: number= 1, early bifurcation: No, other concern: None  Vein: number= 1, concern: None  Ureter: 1, abnormality: None    Other significant findings:  No    Kidney selected for donation: Left    Tariq Eagle MD

## 2017-11-02 ENCOUNTER — TELEPHONE (OUTPATIENT)
Dept: TRANSPLANT | Facility: CLINIC | Age: 27
End: 2017-11-02

## 2017-11-02 NOTE — TELEPHONE ENCOUNTER
At IKTP meeting  11/1/2017, it was determined that there is DSA present and we need to do a interim XM to confirm if he can give directly.  I have called him today and left a message with my contact info.

## 2017-11-03 ENCOUNTER — TELEPHONE (OUTPATIENT)
Dept: TRANSPLANT | Facility: CLINIC | Age: 27
End: 2017-11-03

## 2017-11-03 NOTE — TELEPHONE ENCOUNTER
I called again to speak to the patient, earlier I had called and LM then he called and LM with me.  We need to discuss the review of the compatibility testing:  DSA present but may be able to go direct, need to do a flow XM.  Will need to set that up.

## 2017-11-06 ENCOUNTER — TELEPHONE (OUTPATIENT)
Dept: TRANSPLANT | Facility: CLINIC | Age: 27
End: 2017-11-06

## 2017-11-09 ENCOUNTER — TELEPHONE (OUTPATIENT)
Dept: TRANSPLANT | Facility: CLINIC | Age: 27
End: 2017-11-09

## 2017-11-09 DIAGNOSIS — Z00.5 EXAMINATION OF POTENTIAL DONOR OF ORGAN AND TISSUE: Primary | ICD-10-CM

## 2017-11-09 NOTE — TELEPHONE ENCOUNTER
I spoke to the patient and we reviewed that we need to do a flow XM to confirm if he can give directly to [facility].  There is the presence of two DSA.  The IKTP team felt it may be possible for him to give directly but need flow to confirm.  I have sent task to Admin to mail the patient a kit for the draw.  He will go to the [facility], will need to fax the order to them. I instructed to do the draw early next week.  The recipient blood is ready.

## 2017-12-13 ENCOUNTER — TELEPHONE (OUTPATIENT)
Dept: TRANSPLANT | Facility: CLINIC | Age: 27
End: 2017-12-13

## 2017-12-13 NOTE — TELEPHONE ENCOUNTER
I called the patient to review the results of the flow XM.  At today's IKTP meeting our team reviewed this result, recommendation is that it is okay to be direct with caveat that both the recipient and the donor have counseling on the match and the DSA protocol.    Shantanu stated he was willing to travel here for this counseling.  He will check in with his workplace to let them know that he will need to have some leave.  He will get back to me but suspects early January to  Mid January will be fine for this visit.  I will also send message to the donor social work team regarding if there was possible keith money available for travel expenses.

## 2017-12-15 ENCOUNTER — TELEPHONE (OUTPATIENT)
Dept: TRANSPLANT | Facility: CLINIC | Age: 27
End: 2017-12-15

## 2017-12-18 ENCOUNTER — TELEPHONE (OUTPATIENT)
Dept: TRANSPLANT | Facility: CLINIC | Age: 27
End: 2017-12-18

## 2017-12-18 NOTE — TELEPHONE ENCOUNTER
I received and filed an application for [facility] for this donor today.    LEELEE Pardo, [facility]  Clinical  and Independent Donor Advocate  [facility] [place]  Pager:  [phone]  Direct:  [phone]

## 2018-01-15 ENCOUNTER — TELEPHONE (OUTPATIENT)
Dept: TRANSPLANT | Facility: CLINIC | Age: 28
End: 2018-01-15

## 2018-01-15 NOTE — TELEPHONE ENCOUNTER
I submitted a [employer] application back on 12/18/17 for Shantanu, which was approved.  I called Shantanu today to see if he had been contacted via telephone or e-mail by the NLDA staff and sent a credit card to pay for his travel expenses.  I had to leave a voice mail message.  I asked Shantanu to call me back to clarify anything about how the NLDAC program works.  LEELEE Pardo, [facility]  Clinical  and Independent Donor Advocate  [facility]  Pager:  [phone]  Direct:  [phone]

## 2018-01-18 ENCOUNTER — DOCUMENTATION ONLY (OUTPATIENT)
Dept: TRANSPLANT | Facility: CLINIC | Age: 28
End: 2018-01-18

## 2018-01-18 DIAGNOSIS — Z00.5 EXAMINATION OF POTENTIAL DONOR OF ORGAN AND TISSUE: Primary | ICD-10-CM

## 2018-01-18 NOTE — PROGRESS NOTES
I met with the patient today in the clinic.  He came to discuss with Dr Xavier and his intended recipient the match.  I met with the patient separately and we reviewed his situation at work.  He said they are very supportive of him taking a leave and that he has duties that are no lifting.  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  I recommended not to have full activity at work until 8 weeks of recovery.  I reviewed the process for scheduling surgery.  He said preferrably he would be available the last week in February if it looks like it is okay to give directly.  He said he and his wife have a plan for caring for their child.  I told him that I will reconnect with him after he got the information from Dr Xavier today.   I met with the patient again just as Dr Xavier was finishing her visit.  They are willing to go forward with the direct match.  I will work with the recipient coordinator on finding a date in late February.    He will have to do the serology and final XM kits at his local lab and mail it down.  He was able to get travel assistance that he can use today and when he returns.

## 2018-01-22 ENCOUNTER — TELEPHONE (OUTPATIENT)
Dept: TRANSPLANT | Facility: CLINIC | Age: 28
End: 2018-01-22

## 2018-01-22 DIAGNOSIS — Z00.5 EXAMINATION OF POTENTIAL DONOR OF ORGAN AND TISSUE: ICD-10-CM

## 2018-01-22 PROCEDURE — 86803 HEPATITIS C AB TEST: CPT | Performed by: INTERNAL MEDICINE

## 2018-01-22 PROCEDURE — 87535 HIV-1 PROBE&REVERSE TRNSCRPJ: CPT | Performed by: INTERNAL MEDICINE

## 2018-01-22 PROCEDURE — 87340 HEPATITIS B SURFACE AG IA: CPT | Performed by: INTERNAL MEDICINE

## 2018-01-22 PROCEDURE — 87389 HIV-1 AG W/HIV-1&-2 AB AG IA: CPT | Performed by: INTERNAL MEDICINE

## 2018-01-22 PROCEDURE — 86704 HEP B CORE ANTIBODY TOTAL: CPT | Performed by: INTERNAL MEDICINE

## 2018-01-22 PROCEDURE — 86644 CMV ANTIBODY: CPT | Performed by: INTERNAL MEDICINE

## 2018-01-22 NOTE — TELEPHONE ENCOUNTER
I received a call from the patient who wanted to schedule surgery.  He is willing to move forward with the direct donation to [facility].  He feels that the sooner the better for Lisbet.  I reviewed possible dates and he feels he could do 2/6 as he could stay at family that live in the [facility].  I will get back to him on the planning after I speak to the recipient team.

## 2018-01-29 ENCOUNTER — HOSPITAL ENCOUNTER (OUTPATIENT)
Facility: CLINIC | Age: 28
Setting detail: SPECIMEN
Discharge: HOME | End: 2018-01-29
Admitting: INTERNAL MEDICINE

## 2018-01-29 DIAGNOSIS — Z00.5 EXAMINATION OF POTENTIAL DONOR OF ORGAN AND TISSUE: Primary | ICD-10-CM

## 2018-01-29 PROCEDURE — 87521 HEPATITIS C PROBE&RVRS TRNSC: CPT | Performed by: INTERNAL MEDICINE

## 2018-01-29 PROCEDURE — 87516 HEPATITIS B DNA AMP PROBE: CPT | Performed by: INTERNAL MEDICINE

## 2018-01-29 PROCEDURE — 87535 HIV-1 PROBE&REVERSE TRNSCRPJ: CPT | Performed by: INTERNAL MEDICINE

## 2018-01-30 DIAGNOSIS — Z00.5 EXAMINATION OF POTENTIAL DONOR OF ORGAN AND TISSUE: ICD-10-CM

## 2018-01-30 LAB
CMV IGG SERPL QL IA: <0.2 AI (ref 0–0.8)
HBV CORE AB SERPL QL IA: NONREACTIVE
HBV SURFACE AG SERPL QL IA: NONREACTIVE
HCV AB SERPL QL IA: NONREACTIVE
HIV 1+2 AB+HIV1 P24 AG SERPL QL IA: NONREACTIVE
MPX SERIES: NORMAL

## 2018-01-31 LAB — HLA FINAL CROSSMATCH DONOR: NORMAL

## 2018-02-01 LAB — MPX SERIES: NONREACTIVE

## 2018-02-02 ENCOUNTER — TELEPHONE (OUTPATIENT)
Dept: TRANSPLANT | Facility: CLINIC | Age: 28
End: 2018-02-02

## 2018-02-02 NOTE — TELEPHONE ENCOUNTER
I called the patient to confirm that the blood tests that he sent this week are all done and good.  I asked if he had any questions about his clinic visit on Monday, we talked about the time that he is needed to be here for labs. I answered his questions.

## 2018-02-05 ENCOUNTER — OFFICE VISIT (OUTPATIENT)
Dept: TRANSPLANT | Facility: CLINIC | Age: 28
End: 2018-02-05
Attending: SURGERY

## 2018-02-05 ENCOUNTER — ALLIED HEALTH/NURSE VISIT (OUTPATIENT)
Dept: TRANSPLANT | Facility: CLINIC | Age: 28
End: 2018-02-05
Attending: SURGERY

## 2018-02-05 ENCOUNTER — OFFICE VISIT (OUTPATIENT)
Dept: TRANSPLANT | Facility: CLINIC | Age: 28
End: 2018-02-05
Attending: NURSE PRACTITIONER

## 2018-02-05 ENCOUNTER — APPOINTMENT (OUTPATIENT)
Dept: TRANSPLANT | Facility: CLINIC | Age: 28
End: 2018-02-05
Attending: SURGERY

## 2018-02-05 ENCOUNTER — ANESTHESIA EVENT (OUTPATIENT)
Dept: SURGERY | Facility: CLINIC | Age: 28
DRG: 661 | End: 2018-02-05

## 2018-02-05 VITALS
WEIGHT: 136.9 LBS | SYSTOLIC BLOOD PRESSURE: 107 MMHG | BODY MASS INDEX: 20.75 KG/M2 | HEART RATE: 59 BPM | HEIGHT: 68 IN | TEMPERATURE: 97.5 F | RESPIRATION RATE: 16 BRPM | DIASTOLIC BLOOD PRESSURE: 69 MMHG

## 2018-02-05 DIAGNOSIS — Z00.5 EXAMINATION OF POTENTIAL DONOR OF ORGAN AND TISSUE: ICD-10-CM

## 2018-02-05 DIAGNOSIS — R21 RASH: Primary | ICD-10-CM

## 2018-02-05 DIAGNOSIS — Z00.5 EXAMINATION OF POTENTIAL DONOR OF ORGAN AND TISSUE: Primary | ICD-10-CM

## 2018-02-05 DIAGNOSIS — Z00.5 TRANSPLANT DONOR EVALUATION: Primary | ICD-10-CM

## 2018-02-05 LAB
ABO + RH BLD: NORMAL
ABO + RH BLD: NORMAL
ALBUMIN UR-MCNC: NEGATIVE MG/DL
AMORPH CRY #/AREA URNS HPF: ABNORMAL /HPF
APPEARANCE UR: ABNORMAL
BILIRUB UR QL STRIP: NEGATIVE
BLD GP AB SCN SERPL QL: NORMAL
BLOOD BANK CMNT PATIENT-IMP: NORMAL
COLOR UR AUTO: YELLOW
CREAT SERPL-MCNC: 1.04 MG/DL (ref 0.66–1.25)
GFR SERPL CREATININE-BSD FRML MDRD: 86 ML/MIN/1.7M2
GLUCOSE UR STRIP-MCNC: NEGATIVE MG/DL
HGB BLD-MCNC: 15.5 G/DL (ref 13.3–17.7)
HGB UR QL STRIP: NEGATIVE
KETONES UR STRIP-MCNC: NEGATIVE MG/DL
LEUKOCYTE ESTERASE UR QL STRIP: NEGATIVE
NITRATE UR QL: NEGATIVE
PH UR STRIP: 7 PH (ref 5–7)
RBC #/AREA URNS AUTO: 0 /HPF (ref 0–2)
SOURCE: ABNORMAL
SP GR UR STRIP: 1.02 (ref 1–1.03)
SPECIMEN EXP DATE BLD: NORMAL
UROBILINOGEN UR STRIP-MCNC: 0 MG/DL (ref 0–2)
WBC #/AREA URNS AUTO: 0 /HPF (ref 0–2)

## 2018-02-05 PROCEDURE — 82565 ASSAY OF CREATININE: CPT | Performed by: SURGERY

## 2018-02-05 PROCEDURE — 81001 URINALYSIS AUTO W/SCOPE: CPT | Performed by: SURGERY

## 2018-02-05 PROCEDURE — 86900 BLOOD TYPING SEROLOGIC ABO: CPT | Performed by: SURGERY

## 2018-02-05 PROCEDURE — 86644 CMV ANTIBODY: CPT | Performed by: SURGERY

## 2018-02-05 PROCEDURE — 86901 BLOOD TYPING SEROLOGIC RH(D): CPT | Performed by: SURGERY

## 2018-02-05 PROCEDURE — 40000269 ZZH STATISTIC NO CHARGE FACILITY FEE: Mod: ZF

## 2018-02-05 PROCEDURE — 85018 HEMOGLOBIN: CPT | Performed by: SURGERY

## 2018-02-05 PROCEDURE — 36415 COLL VENOUS BLD VENIPUNCTURE: CPT | Performed by: SURGERY

## 2018-02-05 PROCEDURE — 86850 RBC ANTIBODY SCREEN: CPT | Performed by: SURGERY

## 2018-02-05 RX ORDER — FLUCONAZOLE 200 MG/1
400 TABLET ORAL ONCE
Qty: 2 TABLET | Refills: 0 | Status: ON HOLD | OUTPATIENT
Start: 2018-02-05 | End: 2018-02-06

## 2018-02-05 NOTE — MR AVS SNAPSHOT
"              After Visit Summary   2/5/2018    Shantanu Martinez    MRN: [id number]           Patient Information     Date Of Birth          1990        Visit Information        Provider Department      2/5/2018 12:00 PM Cris Chacko S.A., RN [facility] Solid Organ Transplant        Today's Diagnoses     Examination of potential donor of organ and tissue    -  1       Follow-ups after your visit        Your next 10 appointments already scheduled     Feb 06, 2018   Procedure with Osbaldo Hooker MD   [facility], [place], Same Day Surgery (--)    [address] [id number] [phone]            Feb 26, 2018  2:45 PM CST   (Arrive by 2:30 PM)   Kidney Post Op with Osbaldo Hooker MD   [facility] Solid Organ Transplant ([facility])    [address]  [address]  [facility] [address] [phone]              Who to contact     If you have questions or need follow up information about today's clinic visit or your schedule please contact [facility] SOLID ORGAN TRANSPLANT directly at [phone].  Normal or non-critical lab and imaging results will be communicated to you by Audionamixhart, letter or phone within 4 business days after the clinic has received the results. If you do not hear from us within 7 days, please contact the clinic through Audionamixhart or phone. If you have a critical or abnormal lab result, we will notify you by phone as soon as possible.  Submit refill requests through Witsbits or call your pharmacy and they will forward the refill request to us. Please allow 3 business days for your refill to be completed.          Additional Information About Your Visit        Witsbits Information     Witsbits lets you send messages to your doctor, view your test results, renew your prescriptions, schedule appointments and more. To sign up, go to [email or web address] . Click on \"Log in\" on the left side of the screen, which will take you to the Welcome page. Then click on \"Sign " "up Now\" on the right side of the page.     You will be asked to enter the access code listed below, as well as some personal information. Please follow the directions to create your username and password.     Your access code is: [email or web address]  Expires: 2018  1:44 PM     Your access code will  in 90 days. If you need help or a new code, please call your [place] clinic or [phone].        Care EveryWhere ID     This is your Care EveryWhere ID. This could be used by other organizations to access your [place] medical records  [id number]         Blood Pressure from Last 3 Encounters:   18 107/69   17 116/75   17 113/74    Weight from Last 3 Encounters:   18 62.1 kg (136 lb 14.4 oz)   17 63.5 kg (140 lb)   13 59 kg (130 lb)              Today, you had the following     No orders found for display       Primary Care Provider Office Phone # Fax #    Michael Zelaya [phone]       [facility] [address] [address]        Equal Access to Services     [facility]: Hadii aad ku hadasho Somonicaali, waaxda luqadaha, qaybta kaalmada adeegyada, dee oscar . So [facility] [phone].    ATENCIÓN: Si habla español, tiene a biggs disposición servicios gratuitos de asistencia lingüística. Llame al [phone].    We comply with applicable federal civil rights laws and [place] laws. We do not discriminate on the basis of race, color, national origin, age, disability, sex, sexual orientation, or gender identity.            Thank you!     Thank you for choosing [facility] SOLID ORGAN TRANSPLANT  for your care. Our goal is always to provide you with excellent care. Hearing back from our patients is one way we can continue to improve our services. Please take a few minutes to complete the written survey that you may receive in the mail after your visit with us. Thank you!             Your Updated Medication List - " Protect others around you: Learn how to safely use, store and throw away your medicines at www.disposemymeds.org.          This list is accurate as of 2/5/18  1:44 PM.  Always use your most recent med list.                   Brand Name Dispense Instructions for use Diagnosis    multivitamin, therapeutic with minerals Tabs tablet      Take 1 tablet by mouth daily

## 2018-02-05 NOTE — PROGRESS NOTES
See surgeon note   
Spine appears normal, range of motion is not limited, no muscle or joint tenderness. no leg swelling.

## 2018-02-05 NOTE — MR AVS SNAPSHOT
"              After Visit Summary   2/5/2018    Shantanu Martinez    MRN: [id number]           Patient Information     Date Of Birth          1990        Visit Information        Provider Department      2/5/2018 1:30 PM Osbaldo Hooker MD [facility] Solid Organ Transplant        Today's Diagnoses     Rash    -  1       Follow-ups after your visit        Your next 10 appointments already scheduled     Feb 06, 2018   Procedure with Osbaldo Hooker MD   [facility], [place], Same Day Surgery (--)    [address] [place] [address] [phone]            Feb 26, 2018  2:45 PM CST   (Arrive by 2:30 PM)   Kidney Post Op with Osbaldo Hooker MD   [facility] Solid Organ Transplant ([facility])    [address]  [address]  [facility] [address] [phone]              Who to contact     If you have questions or need follow up information about today's clinic visit or your schedule please contact [facility] SOLID ORGAN TRANSPLANT directly at [phone].  Normal or non-critical lab and imaging results will be communicated to you by Impact Solutions Consultinghart, letter or phone within 4 business days after the clinic has received the results. If you do not hear from us within 7 days, please contact the clinic through Impact Solutions Consultinghart or phone. If you have a critical or abnormal lab result, we will notify you by phone as soon as possible.  Submit refill requests through All Web Leads or call your pharmacy and they will forward the refill request to us. Please allow 3 business days for your refill to be completed.          Additional Information About Your Visit        Impact Solutions Consultinghart Information     All Web Leads lets you send messages to your doctor, view your test results, renew your prescriptions, schedule appointments and more. To sign up, go to [email or web address] [email or web address] [email or web address] . Click on \"Log in\" on the left side of the screen, which will take you to the Welcome page. Then click on \"Sign up Now\" on the right side of the page.     You will " be asked to enter the access code listed below, as well as some personal information. Please follow the directions to create your username and password.     Your access code is: [email or web address]  Expires: 2018  1:44 PM     Your access code will  in 90 days. If you need help or a new code, please call your [place] clinic or [phone].        Care EveryWhere ID     This is your Care EveryWhere ID. This could be used by other organizations to access your [place] medical records  [id number]         Blood Pressure from Last 3 Encounters:   18 107/69   17 116/75   17 113/74    Weight from Last 3 Encounters:   18 62.1 kg (136 lb 14.4 oz)   17 63.5 kg (140 lb)   13 59 kg (130 lb)              Today, you had the following     No orders found for display         Today's Medication Changes          These changes are accurate as of 18  2:38 PM.  If you have any questions, ask your nurse or doctor.               Start taking these medicines.        Dose/Directions    fluconazole 200 MG tablet   Commonly known as:  DIFLUCAN   Used for:  Rash   Started by:  Osbaldo Hooker MD        Dose:  400 mg   Take 2 tablets (400 mg) by mouth once for 1 dose Patient is kidney donor.  No charge   Quantity:  2 tablet   Refills:  0            Where to get your medicines      These medications were sent to [facility] [address] [address]    Hours:  TRANSPLANT PHONE NUMBER [phone] Phone:  [phone]     fluconazole 200 MG tablet                Primary Care Provider Office Phone # Fax #    Michael Zelaya [phone]       [facility] [address] [address]        Equal Access to Services     TROY MIKE AH: Hemant Leblanc, waaxda luqadaha, qaybta kaalmada rula, dee márquez. So [facility]  [phone].    ATENCIÓN: Si tiki barahona, tiene a biggs disposición servicios gratuitos de asistencia lingüística. Melly al [phone].    We comply with applicable federal civil rights laws and [place] laws. We do not discriminate on the basis of race, color, national origin, age, disability, sex, sexual orientation, or gender identity.            Thank you!     Thank you for choosing [facility] SOLID ORGAN TRANSPLANT  for your care. Our goal is always to provide you with excellent care. Hearing back from our patients is one way we can continue to improve our services. Please take a few minutes to complete the written survey that you may receive in the mail after your visit with us. Thank you!             Your Updated Medication List - Protect others around you: Learn how to safely use, store and throw away your medicines at www.disposemymeds.org.          This list is accurate as of 2/5/18  2:38 PM.  Always use your most recent med list.                   Brand Name Dispense Instructions for use Diagnosis    fluconazole 200 MG tablet    DIFLUCAN    2 tablet    Take 2 tablets (400 mg) by mouth once for 1 dose Patient is kidney donor.  No charge    Rash       multivitamin, therapeutic with minerals Tabs tablet      Take 1 tablet by mouth daily

## 2018-02-05 NOTE — LETTER
2/5/2018       RE: Shantanu Martinez  [address] [place] [address]     Dear Colleague,    Thank you for referring your patient, Shantanu Martinez, to the [facility] SOLID ORGAN TRANSPLANT at [facility]. Please see a copy of my visit note below.    Transplant Surgery H&P                                                        HPI:                                                      Mr. Martinez is a 27 year old male who comes to clinic today for preop prior to planned laparoscopic kidney donation surgery. The patient was previously reviewed by the living donor multidisciplinary selection committee and found to be medically and psychosocially appropriate for voluntary kidney donation. The patient denies any feelings of being pressured to proceed with kidney donation.  Health events since donor evaluation: None    Special considerations:   Constipation: no  PONV: no  Significant neck/back/joint concerns for lateral decubitus positioning?: No  Judaism: No    MEDICAL HISTORY:                                                      Patient Active Problem List    Diagnosis Date Noted     Transplant donor evaluation 05/19/2015     Priority: Medium     Hydrocele, right 08/06/2013     Priority: Medium      Past Medical History:   Diagnosis Date     Asthma     H/O as teen     Heart murmur      Past Surgical History:   Procedure Laterality Date     HERNIORRHAPHY INGUINAL  8/12/2013    Procedure: HERNIORRHAPHY INGUINAL;  Right Inguinal Hernia Repair;  Surgeon: Triston Sandhu MD;  Location: [facility]     HYDROCELECTOMY INGUINAL  8/12/2013    Procedure: HYDROCELECTOMY INGUINAL;  RIGHT HYDROCELECTOMY;  Surgeon: Triston Sandhu MD;  Location: [facility] OR     Current Outpatient Prescriptions   Medication Sig Dispense Refill     multivitamin, therapeutic with minerals (THERA-VIT-M) TABS Take 1 tablet by mouth daily       OTC products: None, except as noted above  Allergies   Allergen Reactions      Amoxicillin      Demerol       Social History   Substance Use Topics     Smoking status: Never Smoker     Smokeless tobacco: Never Used     Alcohol use No     History   Drug Use No       REVIEW OF SYSTEMS:                                                    C: NEGATIVE for fever, chills, change in weight  I: NEGATIVE for worrisome rashes, moles or lesions  E: NEGATIVE for vision changes or irritation  E/M: NEGATIVE for ear, mouth and throat problems  R: NEGATIVE for significant cough or SOB  B: NEGATIVE for masses, tenderness or discharge  CV: NEGATIVE for chest pain, palpitations or peripheral edema  GI: NEGATIVE for nausea, abdominal pain, heartburn, or change in bowel habits  : NEGATIVE for frequency, dysuria, or hematuria  M: NEGATIVE for significant arthralgias or myalgia  N: NEGATIVE for weakness, dizziness or paresthesias  E: NEGATIVE for temperature intolerance, skin/hair changes  H: NEGATIVE for bleeding problems  P: NEGATIVE for changes in mood or affect    EXAM:                                                    There were no vitals taken for this visit.    GENERAL APPEARANCE: healthy, alert and no distress     EYES: EOMI,  PERRL     HENT: ear canals and TM's normal and nose and mouth without ulcers or lesions     NECK: no adenopathy, no asymmetry, masses, or scars and thyroid normal to palpation     RESP: lungs clear to auscultation - no rales, rhonchi or wheezes     CV: regular rates and rhythm, normal S1 S2, no S3 or S4 and no murmur, click or rub     ABDOMEN:  soft, nontender, no HSM or masses and bowel sounds normal     MS: extremities normal- no gross deformities noted, no evidence of inflammation in joints, FROM in all extremities.     SKIN: erythema - trunk and macule - trunk     NEURO: Normal strength and tone, sensory exam grossly normal, mentation intact and speech normal     PSYCH: mentation appears normal. and affect normal/bright     LYMPHATICS: No axillary, cervical, or supraclavicular  nodes    DIAGNOSTICS:                                                      EKG: appears normal, NSR, normal axis, normal intervals, no acute ST/T changes c/w ischemia, no LVH by voltage criteria, unchanged from previous tracings  Chest XRay  Labs Resulted Today:   Results for orders placed or performed in visit on 02/05/18   UA with Microscopic reflex to Culture   Result Value Ref Range    Color Urine Yellow     Appearance Urine Cloudy     Glucose Urine Negative NEG^Negative mg/dL    Bilirubin Urine Negative NEG^Negative    Ketones Urine Negative NEG^Negative mg/dL    Specific Gravity Urine 1.017 1.003 - 1.035    Blood Urine Negative NEG^Negative    pH Urine 7.0 5.0 - 7.0 pH    Protein Albumin Urine Negative NEG^Negative mg/dL    Urobilinogen mg/dL 0.0 0.0 - 2.0 mg/dL    Nitrite Urine Negative NEG^Negative    Leukocyte Esterase Urine Negative NEG^Negative    Source Midstream Urine     WBC Urine 0 0 - 2 /HPF    RBC Urine 0 0 - 2 /HPF    Amorphous Crystals Few (A) NEG^Negative /HPF   Hemoglobin   Result Value Ref Range    Hemoglobin 15.5 13.3 - 17.7 g/dL   Creatinine   Result Value Ref Range    Creatinine 1.04 0.66 - 1.25 mg/dL    GFR Estimate 86 >60 mL/min/1.7m2    GFR Estimate If Black >90 >60 mL/min/1.7m2   ABO/Rh type and screen   Result Value Ref Range    ABO PENDING     Antibody Screen PENDING     Test Valid Only At          [facility]    Specimen Expires 02/08/2018      Labs Drawn and in Process:   Unresulted Labs Ordered in the Past 30 Days of this Admission     Date and Time Order Name Status Description    2/5/2018 1059 ABO/RH TYPE AND SCREEN In process     2/5/2018 1059 CMV ANTIBODY IGG In process         Recent Labs   Lab Test  02/05/18   1109  08/03/17   0908  03/07/15   1214  03/07/15   1205   06/07/13   2108   HGB  15.5  16.2  15.1   --    < >  13.1*   PLT   --   183  169   --    < >  212   INR   --   1.10   --    --    --   1.11   NA   --   142   --   139    < >  140   POTASSIUM   --   4.0   --   4.1   < >  3.9   CR  1.04  1.10   --   1.02   < >  1.10   A1C   --   5.0   --    --    --    --     < > = values in this interval not displayed.      Assessment:                                                    Healthy voluntary kidney donor    There have been no significant intercurrent medical problems or change of intent in proceeding with kidney donation as scheduled on 2/6/2018    1. Labs reviewed and within normal limits: No  2. EKG (8/3/2017): appears normal  3. Paired Exchange case: No  4. ABO= PENDING  5. Laterality: left  6. Outstanding issues: final abo and cross match  7. Rash:  Fluconazole 400mg times one ordered     Plan:                                                      1. Consent: done   2. Outstanding issues:Final ABO and cross match     Signed Electronically by: Debby Carver cnp     I have reviewed history, examined patient and discussed plan with the fellow/resident/HODAN.  I concur with the findings in this note.    Risks of the surgical procedure including but not limited to the rare risk of mortality discussed in detail. Patient verbalized good understanding and had several pertinent questions which were answered satisfactorily.

## 2018-02-05 NOTE — MR AVS SNAPSHOT
"              After Visit Summary   2/5/2018    Shantanu Martinez    MRN: [id number]           Patient Information     Date Of Birth          1990        Visit Information        Provider Department      2/5/2018 12:30 PM Debby Carver NP [facility] Solid Organ Transplant        Today's Diagnoses     Examination of potential donor of organ and tissue    -  1       Follow-ups after your visit        Your next 10 appointments already scheduled     Feb 06, 2018   Procedure with Osbaldo Hooker MD   [facility], [place], Same Day Surgery (--)    [address] [facility] [address] [phone]            Feb 26, 2018  2:45 PM CST   (Arrive by 2:30 PM)   Kidney Post Op with Osbaldo Hooker MD   [facility] Solid Organ Transplant ([facility])    [address]  [address]  [facility] [address] [phone]              Who to contact     If you have questions or need follow up information about today's clinic visit or your schedule please contact [facility] SOLID ORGAN TRANSPLANT directly at [phone].  Normal or non-critical lab and imaging results will be communicated to you by Context Aware Solutionshart, letter or phone within 4 business days after the clinic has received the results. If you do not hear from us within 7 days, please contact the clinic through Context Aware Solutionshart or phone. If you have a critical or abnormal lab result, we will notify you by phone as soon as possible.  Submit refill requests through Atlantia Search or call your pharmacy and they will forward the refill request to us. Please allow 3 business days for your refill to be completed.          Additional Information About Your Visit        Context Aware Solutionshart Information     Atlantia Search lets you send messages to your doctor, view your test results, renew your prescriptions, schedule appointments and more. To sign up, go to [email or web address] Search . Click on \"Log in\" on the left side of the screen, which will take you to the Welcome page. Then click on \"Sign up " "Now\" on the right side of the page.     You will be asked to enter the access code listed below, as well as some personal information. Please follow the directions to create your username and password.     Your access code is: [email or web address]  Expires: 2018  1:44 PM     Your access code will  in 90 days. If you need help or a new code, please call your [place] clinic or [phone].        Care EveryWhere ID     This is your Care EveryWhere ID. This could be used by other organizations to access your [place] medical records  [id number]         Blood Pressure from Last 3 Encounters:   18 107/69   17 116/75   17 113/74    Weight from Last 3 Encounters:   18 62.1 kg (136 lb 14.4 oz)   17 63.5 kg (140 lb)   13 59 kg (130 lb)              Today, you had the following     No orders found for display         Today's Medication Changes          These changes are accurate as of 18  2:42 PM.  If you have any questions, ask your nurse or doctor.               Start taking these medicines.        Dose/Directions    fluconazole 200 MG tablet   Commonly known as:  DIFLUCAN   Used for:  Rash   Started by:  Osbaldo Hooker MD        Dose:  400 mg   Take 2 tablets (400 mg) by mouth once for 1 dose Patient is kidney donor.  No charge   Quantity:  2 tablet   Refills:  0            Where to get your medicines      These medications were sent to [facility] [address] [address]    Hours:  TRANSPLANT PHONE NUMBER [phone] Phone:  [phone]     fluconazole 200 MG tablet                Primary Care Provider Office Phone # Fax #    Michael Zelaya [phone]       [facility] [address] [address]        Equal Access to Services     TROY MIKE AH: Hadii aakash pettit Sonilson, waaxda luqadaha, qaybta isamaralcrissy horta, dee eaton " ragini cliftonpeggyaury guzmanAdamaatita ah. So [facility] [phone].    ATENCIÓN: Si habla [place], tiene a biggs disposición servicios gratuitos de asistencia lingüística. Melly al [phone].    We comply with applicable federal civil rights laws and [place] laws. We do not discriminate on the basis of race, color, national origin, age, disability, sex, sexual orientation, or gender identity.            Thank you!     Thank you for choosing [facility] SOLID ORGAN TRANSPLANT  for your care. Our goal is always to provide you with excellent care. Hearing back from our patients is one way we can continue to improve our services. Please take a few minutes to complete the written survey that you may receive in the mail after your visit with us. Thank you!             Your Updated Medication List - Protect others around you: Learn how to safely use, store and throw away your medicines at www.disposemymeds.org.          This list is accurate as of 2/5/18  2:42 PM.  Always use your most recent med list.                   Brand Name Dispense Instructions for use Diagnosis    fluconazole 200 MG tablet    DIFLUCAN    2 tablet    Take 2 tablets (400 mg) by mouth once for 1 dose Patient is kidney donor.  No charge    Rash       multivitamin, therapeutic with minerals Tabs tablet      Take 1 tablet by mouth daily

## 2018-02-05 NOTE — PROGRESS NOTES
Transplant Surgery H&P                                                        HPI:                                                      Mr. Martinez is a 27 year old male who comes to clinic today for preop prior to planned laparoscopic kidney donation surgery. The patient was previously reviewed by the living donor multidisciplinary selection committee and found to be medically and psychosocially appropriate for voluntary kidney donation. The patient denies any feelings of being pressured to proceed with kidney donation.  Health events since donor evaluation: None    Special considerations:   Constipation: no  PONV: no  Significant neck/back/joint concerns for lateral decubitus positioning?: No  Episcopalian: No    MEDICAL HISTORY:                                                      Patient Active Problem List    Diagnosis Date Noted     Transplant donor evaluation 05/19/2015     Priority: Medium     Hydrocele, right 08/06/2013     Priority: Medium      Past Medical History:   Diagnosis Date     Asthma     H/O as teen     Heart murmur      Past Surgical History:   Procedure Laterality Date     HERNIORRHAPHY INGUINAL  8/12/2013    Procedure: HERNIORRHAPHY INGUINAL;  Right Inguinal Hernia Repair;  Surgeon: Triston Sandhu MD;  Location: [facility]     HYDROCELECTOMY INGUINAL  8/12/2013    Procedure: HYDROCELECTOMY INGUINAL;  RIGHT HYDROCELECTOMY;  Surgeon: Triston Sandhu MD;  Location: [facility]     Current Outpatient Prescriptions   Medication Sig Dispense Refill     multivitamin, therapeutic with minerals (THERA-VIT-M) TABS Take 1 tablet by mouth daily       OTC products: None, except as noted above  Allergies   Allergen Reactions     Amoxicillin      Demerol       Social History   Substance Use Topics     Smoking status: Never Smoker     Smokeless tobacco: Never Used     Alcohol use No     History   Drug Use No       REVIEW OF SYSTEMS:                                                    C: NEGATIVE for fever,  chills, change in weight  I: NEGATIVE for worrisome rashes, moles or lesions  E: NEGATIVE for vision changes or irritation  E/M: NEGATIVE for ear, mouth and throat problems  R: NEGATIVE for significant cough or SOB  B: NEGATIVE for masses, tenderness or discharge  CV: NEGATIVE for chest pain, palpitations or peripheral edema  GI: NEGATIVE for nausea, abdominal pain, heartburn, or change in bowel habits  : NEGATIVE for frequency, dysuria, or hematuria  M: NEGATIVE for significant arthralgias or myalgia  N: NEGATIVE for weakness, dizziness or paresthesias  E: NEGATIVE for temperature intolerance, skin/hair changes  H: NEGATIVE for bleeding problems  P: NEGATIVE for changes in mood or affect    EXAM:                                                    There were no vitals taken for this visit.    GENERAL APPEARANCE: healthy, alert and no distress     EYES: EOMI,  PERRL     HENT: ear canals and TM's normal and nose and mouth without ulcers or lesions     NECK: no adenopathy, no asymmetry, masses, or scars and thyroid normal to palpation     RESP: lungs clear to auscultation - no rales, rhonchi or wheezes     CV: regular rates and rhythm, normal S1 S2, no S3 or S4 and no murmur, click or rub     ABDOMEN:  soft, nontender, no HSM or masses and bowel sounds normal     MS: extremities normal- no gross deformities noted, no evidence of inflammation in joints, FROM in all extremities.     SKIN: erythema - trunk and macule - trunk     NEURO: Normal strength and tone, sensory exam grossly normal, mentation intact and speech normal     PSYCH: mentation appears normal. and affect normal/bright     LYMPHATICS: No axillary, cervical, or supraclavicular nodes    DIAGNOSTICS:                                                      EKG: appears normal, NSR, normal axis, normal intervals, no acute ST/T changes c/w ischemia, no LVH by voltage criteria, unchanged from previous tracings  Chest XRay  Labs Resulted Today:   Results for orders  placed or performed in visit on 02/05/18   UA with Microscopic reflex to Culture   Result Value Ref Range    Color Urine Yellow     Appearance Urine Cloudy     Glucose Urine Negative NEG^Negative mg/dL    Bilirubin Urine Negative NEG^Negative    Ketones Urine Negative NEG^Negative mg/dL    Specific Gravity Urine 1.017 1.003 - 1.035    Blood Urine Negative NEG^Negative    pH Urine 7.0 5.0 - 7.0 pH    Protein Albumin Urine Negative NEG^Negative mg/dL    Urobilinogen mg/dL 0.0 0.0 - 2.0 mg/dL    Nitrite Urine Negative NEG^Negative    Leukocyte Esterase Urine Negative NEG^Negative    Source Midstream Urine     WBC Urine 0 0 - 2 /HPF    RBC Urine 0 0 - 2 /HPF    Amorphous Crystals Few (A) NEG^Negative /HPF   Hemoglobin   Result Value Ref Range    Hemoglobin 15.5 13.3 - 17.7 g/dL   Creatinine   Result Value Ref Range    Creatinine 1.04 0.66 - 1.25 mg/dL    GFR Estimate 86 >60 mL/min/1.7m2    GFR Estimate If Black >90 >60 mL/min/1.7m2   ABO/Rh type and screen   Result Value Ref Range    ABO PENDING     Antibody Screen PENDING     Test Valid Only At          [facility]    Specimen Expires 02/08/2018      Labs Drawn and in Process:   Unresulted Labs Ordered in the Past 30 Days of this Admission     Date and Time Order Name Status Description    2/5/2018 1059 ABO/RH TYPE AND SCREEN In process     2/5/2018 1059 CMV ANTIBODY IGG In process         Recent Labs   Lab Test  02/05/18   1109  08/03/17   0908  03/07/15   1214  03/07/15   1205   06/07/13   2108   HGB  15.5  16.2  15.1   --    < >  13.1*   PLT   --   183  169   --    < >  212   INR   --   1.10   --    --    --   1.11   NA   --   142   --   139   < >  140   POTASSIUM   --   4.0   --   4.1   < >  3.9   CR  1.04  1.10   --   1.02   < >  1.10   A1C   --   5.0   --    --    --    --     < > = values in this interval not displayed.      Assessment:                                                    Healthy voluntary kidney  donor    There have been no significant intercurrent medical problems or change of intent in proceeding with kidney donation as scheduled on 2/6/2018    1. Labs reviewed and within normal limits: No  2. EKG (8/3/2017): appears normal  3. Paired Exchange case: No  4. ABO= PENDING  5. Laterality: left  6. Outstanding issues: final abo and cross match  7. Rash:  Fluconazole 400mg times one ordered     Plan:                                                      1. Consent: done   2. Outstanding issues:Final ABO and cross match     Signed Electronically by: Debby Carver cnp     I have reviewed history, examined patient and discussed plan with the fellow/resident/HODAN.  I concur with the findings in this note.    Risks of the surgical procedure including but not limited to the rare risk of mortality discussed in detail. Patient verbalized good understanding and had several pertinent questions which were answered satisfactorily.

## 2018-02-05 NOTE — PROGRESS NOTES
Pre Op Teaching Flowsheet       Pre and Post op Patient Education  Relevant Diagnosis:  Day -1 kidney donor  Teaching Topic:  Pre and post op teaching  Person Involved in teaching:  Shantanu Martinez     Motivation Level:  Asks Questions: Yes  Eager to Learn:  Yes  Cooperative: Yes  Receptive (willing/able to accept information):  Yes  Patient demonstrates understanding of the following:  Date and time of surgery:  2/06/2018  Location of surgery:  [address] [place] [address]  History and Physical and any other testing necessary prior to surgery: Yes  Required time line for completion of History and Physical and any pre-op testing: Yes    NPO Guidelines: NPO after midnight    Patient demonstrates understanding of the following:  Pre-op bowel prep:  N/A  Pre-op showering/scrub information with PCMX Soap: Yes  Medications to take the day of surgery:  Per PCP  Blood thinner medications discussed and when to stop (if applicable):  N/A  Diabetes medication management (if applicable):  N/A  Discussed pain control after surgery: pain medications  Infection Prevention: Patient demonstrates understanding of the following:  Patient instructed on hand hygiene:  Yes  Surgical procedure site care taught: at time of discharge  Signs and symptoms of infection taught:  Yes  Wound care will be taught at the time of discharge.  Central venous catheter care will be taught at the time of discharge (if applicable).    Post-op follow-up:  Discussed how to contact the hospital, nurse, and clinic scheduling staff if necessary.    Instructional materials used/given/mailed:  [facility] Booklet, post op teaching sheet, Map, Soap, and arrival/location information.    Surgical instructions given to patient Yes.    Patient attended all appointments and completed all scheduled tests.  Patient to follow up with Transplant Coordinator.  Patient stated understanding and has contact numbers.

## 2018-02-05 NOTE — MR AVS SNAPSHOT
"              After Visit Summary   2/5/2018    Shantanu Martinez    MRN: [id number]           Patient Information     Date Of Birth          1990        Visit Information        Provider Department      2/5/2018 11:45 AM [facility]        Today's Diagnoses     Transplant donor evaluation    -  1       Follow-ups after your visit        Your next 10 appointments already scheduled     Feb 06, 2018   Procedure with Osbaldo Hooker MD   [facility], [place], Same Day Surgery (--)    [address] [id number] [phone]            Feb 26, 2018  2:45 PM CST   (Arrive by 2:30 PM)   Kidney Post Op with Osbaldo Hooker MD   [facility] ([facility])    [address]  [address]  [place] [address] [phone]              Who to contact     If you have questions or need follow up information about today's clinic visit or your schedule please contact [facility] TRANSPL[facility] directly at [phone].  Normal or non-critical lab and imaging results will be communicated to you by [employer], letter or phone within 4 business days after the clinic has received the results. If you do not hear from us within 7 days, please contact the clinic through [employer] or phone. If you have a critical or abnormal lab result, we will notify you by phone as soon as possible.  Submit refill requests through [employer] or call your pharmacy and they will forward the refill request to us. Please allow 3 business days for your refill to be completed.          Additional Information About Your Visit        [employer] Information     [employer] lets you send messages to your doctor, view your test results, renew your prescriptions, schedule appointments and more. To sign up, go to [email or web address] . Click on \"Log in\" on the left side of the screen, which will take you to the Welcome page. Then click on \"Sign up Now\" on the right side of the " "page.     You will be asked to enter the access code listed below, as well as some personal information. Please follow the directions to create your username and password.     Your access code is: [email or web address]  Expires: 2018  1:44 PM     Your access code will  in 90 days. If you need help or a new code, please call your [facility] clinic or [phone].        Care EveryWhere ID     This is your Care EveryWhere ID. This could be used by other organizations to access your [facility] medical records  [id number]        Your Vitals Were     Pulse Temperature Respirations Height BMI (Body Mass Index)       59 97.5  F (36.4  C) (Oral) 16 1.727 m (5' 8\") 20.82 kg/m2        Blood Pressure from Last 3 Encounters:   18 107/69   17 116/75   17 113/74    Weight from Last 3 Encounters:   18 62.1 kg (136 lb 14.4 oz)   17 63.5 kg (140 lb)   13 59 kg (130 lb)              Today, you had the following     No orders found for display         Today's Medication Changes          These changes are accurate as of 18  2:58 PM.  If you have any questions, ask your nurse or doctor.               Start taking these medicines.        Dose/Directions    fluconazole 200 MG tablet   Commonly known as:  DIFLUCAN   Used for:  Rash   Started by:  Osbaldo Hooker MD        Dose:  400 mg   Take 2 tablets (400 mg) by mouth once for 1 dose Patient is kidney donor.  No charge   Quantity:  2 tablet   Refills:  0            Where to get your medicines      These medications were sent to [facility] [place], [place] - [address]  [address], [facility] [address]    Hours:  TRANSPLANT PHONE NUMBER [phone] Phone:  [phone]     fluconazole 200 MG tablet                Primary Care Provider Office Phone # Fax #    Michael Zelaya [phone]       [facility] [address] [place] [id number]        Equal Access to " Services     [facility]: Hadii aad ku hadralphveronica Leblanc, waangieda luqadaha, qaybta kaalmarea horta, dee oscar . So [facility] [phone].    ATENCIÓN: Si habla español, tiene a biggs disposición servicios gratuitos de asistencia lingüística. Llame al [phone].    We comply with applicable federal civil rights laws and [place] laws. We do not discriminate on the basis of race, color, national origin, age, disability, sex, sexual orientation, or gender identity.            Thank you!     Thank you for choosing [facility] TRANSPL[facility]  for your care. Our goal is always to provide you with excellent care. Hearing back from our patients is one way we can continue to improve our services. Please take a few minutes to complete the written survey that you may receive in the mail after your visit with us. Thank you!             Your Updated Medication List - Protect others around you: Learn how to safely use, store and throw away your medicines at www.disposemymeds.org.          This list is accurate as of 2/5/18  2:58 PM.  Always use your most recent med list.                   Brand Name Dispense Instructions for use Diagnosis    fluconazole 200 MG tablet    DIFLUCAN    2 tablet    Take 2 tablets (400 mg) by mouth once for 1 dose Patient is kidney donor.  No charge    Rash       multivitamin, therapeutic with minerals Tabs tablet      Take 1 tablet by mouth daily

## 2018-02-05 NOTE — NURSING NOTE
Donor in clinic today for preparation for surgery tomorrow.  He will be donating directly to his friend Lisbet Carrasquillo.  He stated he was doing well today. He was accompanied by his wife and their daughter.  They drove today from their home in [place]. They are staying at the [employer].   He stated that when he returns to work he will need a leave paperwork submitted.   We reviewed the no lifting anything greater than 10 pounds lifting restriction that he will have for 6 weeks.  I stated that the surgeon will want to ease into full lifting after 8 weeks.  We reviewed that he will need to come back to clinic for a 2 week visit with the Surgeon.  I reviewed his bills will be paid for by insurance of recipient.   I reviewed pain control and bowel issue procedures and medication that is typically prescribed. Pt will meet with Surgeon today to develop plan for surgery.   I gave him a donor blanket, parking and food vouchers and thank you card. I thanked him for all that he is doing to make this happen.  He will plan to check into the hospital at 0630 with a start time of his surgery at 0900.

## 2018-02-05 NOTE — LETTER
2/5/2018       RE: Shantanu Martinez  [address] [place] [address]     Dear Colleague,    Thank you for referring your patient, Shantanu Martinez, to the [facility] at [facility]. Please see a copy of my visit note below.    See surgeon note     Again, thank you for allowing me to participate in the care of your patient.      Sincerely,    Debby Carver NP

## 2018-02-06 ENCOUNTER — ANESTHESIA (OUTPATIENT)
Dept: SURGERY | Facility: CLINIC | Age: 28
DRG: 661 | End: 2018-02-06

## 2018-02-06 ENCOUNTER — HOSPITAL ENCOUNTER (INPATIENT)
Facility: CLINIC | Age: 28
LOS: 3 days | Discharge: HOME | DRG: 661 | End: 2018-02-09
Attending: SURGERY | Admitting: SURGERY

## 2018-02-06 DIAGNOSIS — Z00.5 TRANSPLANT DONOR EVALUATION: Primary | ICD-10-CM

## 2018-02-06 DIAGNOSIS — Z52.4 ENCOUNTER FOR DONATION OF KIDNEY: ICD-10-CM

## 2018-02-06 LAB
CMV IGG SERPL QL IA: <0.2 AI (ref 0–0.8)
GLUCOSE BLDC GLUCOMTR-MCNC: 77 MG/DL (ref 70–99)
HCT VFR BLD AUTO: 41.6 % (ref 40–53)
HGB BLD-MCNC: 14.7 G/DL (ref 13.3–17.7)

## 2018-02-06 PROCEDURE — 25000128 H RX IP 250 OP 636: Performed by: ANESTHESIOLOGY

## 2018-02-06 PROCEDURE — 37000009 ZZH ANESTHESIA TECHNICAL FEE, EACH ADDTL 15 MIN: Performed by: SURGERY

## 2018-02-06 PROCEDURE — 25000128 H RX IP 250 OP 636: Performed by: STUDENT IN AN ORGANIZED HEALTH CARE EDUCATION/TRAINING PROGRAM

## 2018-02-06 PROCEDURE — 40000171 ZZH STATISTIC PRE-PROCEDURE ASSESSMENT III: Performed by: SURGERY

## 2018-02-06 PROCEDURE — 25000128 H RX IP 250 OP 636: Performed by: TRANSPLANT SURGERY

## 2018-02-06 PROCEDURE — 25000128 H RX IP 250 OP 636: Performed by: NURSE ANESTHETIST, CERTIFIED REGISTERED

## 2018-02-06 PROCEDURE — 00000146 ZZHCL STATISTIC GLUCOSE BY METER IP

## 2018-02-06 PROCEDURE — 25000132 ZZH RX MED GY IP 250 OP 250 PS 637: Performed by: TRANSPLANT SURGERY

## 2018-02-06 PROCEDURE — 37000008 ZZH ANESTHESIA TECHNICAL FEE, 1ST 30 MIN: Performed by: SURGERY

## 2018-02-06 PROCEDURE — 25000125 ZZHC RX 250: Performed by: ANESTHESIOLOGY

## 2018-02-06 PROCEDURE — 12000025 ZZH R&B TRANSPLANT INTERMEDIATE

## 2018-02-06 PROCEDURE — 0TT10ZZ RESECTION OF LEFT KIDNEY, OPEN APPROACH: ICD-10-PCS | Performed by: SURGERY

## 2018-02-06 PROCEDURE — 71000014 ZZH RECOVERY PHASE 1 LEVEL 2 FIRST HR: Performed by: SURGERY

## 2018-02-06 PROCEDURE — C9290 INJ, BUPIVACAINE LIPOSOME: HCPCS | Performed by: STUDENT IN AN ORGANIZED HEALTH CARE EDUCATION/TRAINING PROGRAM

## 2018-02-06 PROCEDURE — 25000125 ZZHC RX 250: Performed by: SURGERY

## 2018-02-06 PROCEDURE — 36000064 ZZH SURGERY LEVEL 4 EA 15 ADDTL MIN - UMMC: Performed by: SURGERY

## 2018-02-06 PROCEDURE — 85014 HEMATOCRIT: CPT | Performed by: TRANSPLANT SURGERY

## 2018-02-06 PROCEDURE — 85018 HEMOGLOBIN: CPT | Performed by: TRANSPLANT SURGERY

## 2018-02-06 PROCEDURE — C9399 UNCLASSIFIED DRUGS OR BIOLOG: HCPCS | Performed by: NURSE ANESTHETIST, CERTIFIED REGISTERED

## 2018-02-06 PROCEDURE — 25000566 ZZH SEVOFLURANE, EA 15 MIN: Performed by: SURGERY

## 2018-02-06 PROCEDURE — 36415 COLL VENOUS BLD VENIPUNCTURE: CPT | Performed by: TRANSPLANT SURGERY

## 2018-02-06 PROCEDURE — 71000015 ZZH RECOVERY PHASE 1 LEVEL 2 EA ADDTL HR: Performed by: SURGERY

## 2018-02-06 PROCEDURE — 25000128 H RX IP 250 OP 636: Performed by: NURSE PRACTITIONER

## 2018-02-06 PROCEDURE — 27210794 ZZH OR GENERAL SUPPLY STERILE: Performed by: SURGERY

## 2018-02-06 PROCEDURE — 36000062 ZZH SURGERY LEVEL 4 1ST 30 MIN - UMMC: Performed by: SURGERY

## 2018-02-06 RX ORDER — NALOXONE HYDROCHLORIDE 0.4 MG/ML
.1-.4 INJECTION, SOLUTION INTRAMUSCULAR; INTRAVENOUS; SUBCUTANEOUS
Status: DISCONTINUED | OUTPATIENT
Start: 2018-02-06 | End: 2018-02-06 | Stop reason: HOSPADM

## 2018-02-06 RX ORDER — HYDRALAZINE HYDROCHLORIDE 20 MG/ML
2.5-5 INJECTION INTRAMUSCULAR; INTRAVENOUS EVERY 10 MIN PRN
Status: DISCONTINUED | OUTPATIENT
Start: 2018-02-06 | End: 2018-02-06 | Stop reason: HOSPADM

## 2018-02-06 RX ORDER — ACETAMINOPHEN 10 MG/ML
1000 INJECTION, SOLUTION INTRAVENOUS ONCE
Status: COMPLETED | OUTPATIENT
Start: 2018-02-06 | End: 2018-02-06

## 2018-02-06 RX ORDER — FENTANYL CITRATE 50 UG/ML
25-50 INJECTION, SOLUTION INTRAMUSCULAR; INTRAVENOUS EVERY 5 MIN PRN
Status: DISCONTINUED | OUTPATIENT
Start: 2018-02-06 | End: 2018-02-06 | Stop reason: HOSPADM

## 2018-02-06 RX ORDER — LEVOFLOXACIN 5 MG/ML
500 INJECTION, SOLUTION INTRAVENOUS EVERY 24 HOURS
Status: DISCONTINUED | OUTPATIENT
Start: 2018-02-06 | End: 2018-02-06 | Stop reason: HOSPADM

## 2018-02-06 RX ORDER — ONDANSETRON 4 MG/1
4 TABLET, ORALLY DISINTEGRATING ORAL EVERY 30 MIN PRN
Status: DISCONTINUED | OUTPATIENT
Start: 2018-02-06 | End: 2018-02-06 | Stop reason: HOSPADM

## 2018-02-06 RX ORDER — MANNITOL 20 G/100ML
INJECTION, SOLUTION INTRAVENOUS PRN
Status: DISCONTINUED | OUTPATIENT
Start: 2018-02-06 | End: 2018-02-06

## 2018-02-06 RX ORDER — FUROSEMIDE 10 MG/ML
INJECTION INTRAMUSCULAR; INTRAVENOUS PRN
Status: DISCONTINUED | OUTPATIENT
Start: 2018-02-06 | End: 2018-02-06

## 2018-02-06 RX ORDER — ONDANSETRON 2 MG/ML
4 INJECTION INTRAMUSCULAR; INTRAVENOUS EVERY 6 HOURS PRN
Status: DISCONTINUED | OUTPATIENT
Start: 2018-02-06 | End: 2018-02-09

## 2018-02-06 RX ORDER — SCOLOPAMINE TRANSDERMAL SYSTEM 1 MG/1
1 PATCH, EXTENDED RELEASE TRANSDERMAL ONCE
Status: COMPLETED | OUTPATIENT
Start: 2018-02-06 | End: 2018-02-06

## 2018-02-06 RX ORDER — ONDANSETRON 4 MG/1
4 TABLET, ORALLY DISINTEGRATING ORAL EVERY 6 HOURS PRN
Status: DISCONTINUED | OUTPATIENT
Start: 2018-02-06 | End: 2018-02-09 | Stop reason: HOSPADM

## 2018-02-06 RX ORDER — BUPIVACAINE HYDROCHLORIDE AND EPINEPHRINE 2.5; 5 MG/ML; UG/ML
INJECTION, SOLUTION INFILTRATION; PERINEURAL PRN
Status: DISCONTINUED | OUTPATIENT
Start: 2018-02-06 | End: 2018-02-06

## 2018-02-06 RX ORDER — CARDIOPLEG/ORGAN PRESERV NO.1 9-198-2-1
BOTTLE PERFUSION PRN
Status: DISCONTINUED | OUTPATIENT
Start: 2018-02-06 | End: 2018-02-06 | Stop reason: HOSPADM

## 2018-02-06 RX ORDER — PROPOFOL 10 MG/ML
INJECTION, EMULSION INTRAVENOUS PRN
Status: DISCONTINUED | OUTPATIENT
Start: 2018-02-06 | End: 2018-02-06

## 2018-02-06 RX ORDER — PROCHLORPERAZINE MALEATE 5 MG
10 TABLET ORAL EVERY 6 HOURS PRN
Status: DISCONTINUED | OUTPATIENT
Start: 2018-02-06 | End: 2018-02-09 | Stop reason: HOSPADM

## 2018-02-06 RX ORDER — OXYCODONE HYDROCHLORIDE 5 MG/1
5-10 TABLET ORAL
Status: DISCONTINUED | OUTPATIENT
Start: 2018-02-06 | End: 2018-02-09 | Stop reason: HOSPADM

## 2018-02-06 RX ORDER — FENTANYL CITRATE 50 UG/ML
25-50 INJECTION, SOLUTION INTRAMUSCULAR; INTRAVENOUS
Status: DISCONTINUED | OUTPATIENT
Start: 2018-02-06 | End: 2018-02-06 | Stop reason: HOSPADM

## 2018-02-06 RX ORDER — SODIUM CHLORIDE, SODIUM LACTATE, POTASSIUM CHLORIDE, CALCIUM CHLORIDE 600; 310; 30; 20 MG/100ML; MG/100ML; MG/100ML; MG/100ML
INJECTION, SOLUTION INTRAVENOUS CONTINUOUS
Status: DISCONTINUED | OUTPATIENT
Start: 2018-02-06 | End: 2018-02-07

## 2018-02-06 RX ORDER — SODIUM CHLORIDE, SODIUM LACTATE, POTASSIUM CHLORIDE, CALCIUM CHLORIDE 600; 310; 30; 20 MG/100ML; MG/100ML; MG/100ML; MG/100ML
INJECTION, SOLUTION INTRAVENOUS CONTINUOUS
Status: DISCONTINUED | OUTPATIENT
Start: 2018-02-06 | End: 2018-02-06 | Stop reason: HOSPADM

## 2018-02-06 RX ORDER — DEXAMETHASONE SODIUM PHOSPHATE 4 MG/ML
4 INJECTION, SOLUTION INTRA-ARTICULAR; INTRALESIONAL; INTRAMUSCULAR; INTRAVENOUS; SOFT TISSUE EVERY 10 MIN PRN
Status: DISCONTINUED | OUTPATIENT
Start: 2018-02-06 | End: 2018-02-06 | Stop reason: HOSPADM

## 2018-02-06 RX ORDER — HYDROMORPHONE HYDROCHLORIDE 1 MG/ML
.3-.5 INJECTION, SOLUTION INTRAMUSCULAR; INTRAVENOUS; SUBCUTANEOUS
Status: DISCONTINUED | OUTPATIENT
Start: 2018-02-06 | End: 2018-02-07

## 2018-02-06 RX ORDER — FLUMAZENIL 0.1 MG/ML
0.2 INJECTION, SOLUTION INTRAVENOUS
Status: DISCONTINUED | OUTPATIENT
Start: 2018-02-06 | End: 2018-02-06 | Stop reason: HOSPADM

## 2018-02-06 RX ORDER — DEXAMETHASONE SODIUM PHOSPHATE 10 MG/ML
INJECTION, SOLUTION INTRAMUSCULAR; INTRAVENOUS PRN
Status: DISCONTINUED | OUTPATIENT
Start: 2018-02-06 | End: 2018-02-06

## 2018-02-06 RX ORDER — ONDANSETRON 2 MG/ML
INJECTION INTRAMUSCULAR; INTRAVENOUS PRN
Status: DISCONTINUED | OUTPATIENT
Start: 2018-02-06 | End: 2018-02-06

## 2018-02-06 RX ORDER — HEPARIN SODIUM 1000 [USP'U]/ML
INJECTION, SOLUTION INTRAVENOUS; SUBCUTANEOUS CONTINUOUS PRN
Status: DISCONTINUED | OUTPATIENT
Start: 2018-02-06 | End: 2018-02-06

## 2018-02-06 RX ORDER — ONDANSETRON 2 MG/ML
4 INJECTION INTRAMUSCULAR; INTRAVENOUS EVERY 30 MIN PRN
Status: DISCONTINUED | OUTPATIENT
Start: 2018-02-06 | End: 2018-02-06 | Stop reason: HOSPADM

## 2018-02-06 RX ORDER — PROTAMINE SULFATE 10 MG/ML
INJECTION, SOLUTION INTRAVENOUS PRN
Status: DISCONTINUED | OUTPATIENT
Start: 2018-02-06 | End: 2018-02-06

## 2018-02-06 RX ORDER — AMOXICILLIN 250 MG
2 CAPSULE ORAL 2 TIMES DAILY
Status: DISCONTINUED | OUTPATIENT
Start: 2018-02-07 | End: 2018-02-09 | Stop reason: HOSPADM

## 2018-02-06 RX ORDER — FENTANYL CITRATE 50 UG/ML
INJECTION, SOLUTION INTRAMUSCULAR; INTRAVENOUS PRN
Status: DISCONTINUED | OUTPATIENT
Start: 2018-02-06 | End: 2018-02-06

## 2018-02-06 RX ORDER — NALOXONE HYDROCHLORIDE 0.4 MG/ML
.1-.4 INJECTION, SOLUTION INTRAMUSCULAR; INTRAVENOUS; SUBCUTANEOUS
Status: DISCONTINUED | OUTPATIENT
Start: 2018-02-06 | End: 2018-02-09 | Stop reason: HOSPADM

## 2018-02-06 RX ORDER — ACETAMINOPHEN 325 MG/1
650 TABLET ORAL EVERY 4 HOURS PRN
Status: DISCONTINUED | OUTPATIENT
Start: 2018-02-06 | End: 2018-02-09 | Stop reason: HOSPADM

## 2018-02-06 RX ORDER — FENTANYL CITRATE 50 UG/ML
25-50 INJECTION, SOLUTION INTRAMUSCULAR; INTRAVENOUS
Status: DISCONTINUED | OUTPATIENT
Start: 2018-02-06 | End: 2018-02-06

## 2018-02-06 RX ORDER — KETOROLAC TROMETHAMINE 30 MG/ML
15 INJECTION, SOLUTION INTRAMUSCULAR; INTRAVENOUS EVERY 6 HOURS
Status: COMPLETED | OUTPATIENT
Start: 2018-02-06 | End: 2018-02-07

## 2018-02-06 RX ADMIN — MIDAZOLAM 2 MG: 1 INJECTION INTRAMUSCULAR; INTRAVENOUS at 08:55

## 2018-02-06 RX ADMIN — ROCURONIUM BROMIDE 60 MG: 10 INJECTION INTRAVENOUS at 09:15

## 2018-02-06 RX ADMIN — PROTAMINE SULFATE 30 MG: 10 INJECTION, SOLUTION INTRAVENOUS at 11:18

## 2018-02-06 RX ADMIN — ONDANSETRON 4 MG: 2 INJECTION INTRAMUSCULAR; INTRAVENOUS at 09:20

## 2018-02-06 RX ADMIN — ROCURONIUM BROMIDE 10 MG: 10 INJECTION INTRAVENOUS at 11:22

## 2018-02-06 RX ADMIN — FENTANYL CITRATE 50 MCG: 50 INJECTION, SOLUTION INTRAMUSCULAR; INTRAVENOUS at 13:50

## 2018-02-06 RX ADMIN — SODIUM CHLORIDE, POTASSIUM CHLORIDE, SODIUM LACTATE AND CALCIUM CHLORIDE: 600; 310; 30; 20 INJECTION, SOLUTION INTRAVENOUS at 13:47

## 2018-02-06 RX ADMIN — DEXAMETHASONE SODIUM PHOSPHATE 6 MG: 10 INJECTION, SOLUTION INTRAMUSCULAR; INTRAVENOUS at 09:20

## 2018-02-06 RX ADMIN — SODIUM CHLORIDE, POTASSIUM CHLORIDE, SODIUM LACTATE AND CALCIUM CHLORIDE: 600; 310; 30; 20 INJECTION, SOLUTION INTRAVENOUS at 09:30

## 2018-02-06 RX ADMIN — OXYCODONE HYDROCHLORIDE 5 MG: 5 TABLET ORAL at 16:47

## 2018-02-06 RX ADMIN — KETOROLAC TROMETHAMINE 15 MG: 30 INJECTION, SOLUTION INTRAMUSCULAR at 16:03

## 2018-02-06 RX ADMIN — BUPIVACAINE 20 ML: 13.3 INJECTION, SUSPENSION, LIPOSOMAL INFILTRATION at 07:40

## 2018-02-06 RX ADMIN — FUROSEMIDE 5 MG: 10 INJECTION, SOLUTION INTRAVENOUS at 10:50

## 2018-02-06 RX ADMIN — FENTANYL CITRATE 125 MCG: 50 INJECTION, SOLUTION INTRAMUSCULAR; INTRAVENOUS at 10:16

## 2018-02-06 RX ADMIN — HYDROMORPHONE HYDROCHLORIDE 0.5 MG: 1 INJECTION, SOLUTION INTRAMUSCULAR; INTRAVENOUS; SUBCUTANEOUS at 12:08

## 2018-02-06 RX ADMIN — SODIUM CHLORIDE, POTASSIUM CHLORIDE, SODIUM LACTATE AND CALCIUM CHLORIDE 25 ML/HR: 600; 310; 30; 20 INJECTION, SOLUTION INTRAVENOUS at 07:51

## 2018-02-06 RX ADMIN — SODIUM CHLORIDE, POTASSIUM CHLORIDE, SODIUM LACTATE AND CALCIUM CHLORIDE: 600; 310; 30; 20 INJECTION, SOLUTION INTRAVENOUS at 10:43

## 2018-02-06 RX ADMIN — BUPIVACAINE HYDROCHLORIDE AND EPINEPHRINE BITARTRATE 20 ML: 2.5; .005 INJECTION, SOLUTION INFILTRATION; PERINEURAL at 07:40

## 2018-02-06 RX ADMIN — SUGAMMADEX 120 MG: 100 INJECTION, SOLUTION INTRAVENOUS at 12:28

## 2018-02-06 RX ADMIN — HYDROMORPHONE HYDROCHLORIDE 0.5 MG: 1 INJECTION, SOLUTION INTRAMUSCULAR; INTRAVENOUS; SUBCUTANEOUS at 11:39

## 2018-02-06 RX ADMIN — Medication 0.2 MG: at 13:59

## 2018-02-06 RX ADMIN — SODIUM CHLORIDE, POTASSIUM CHLORIDE, SODIUM LACTATE AND CALCIUM CHLORIDE: 600; 310; 30; 20 INJECTION, SOLUTION INTRAVENOUS at 11:29

## 2018-02-06 RX ADMIN — SCOPALAMINE 1 PATCH: 1 PATCH, EXTENDED RELEASE TRANSDERMAL at 08:34

## 2018-02-06 RX ADMIN — ROCURONIUM BROMIDE 20 MG: 10 INJECTION INTRAVENOUS at 11:02

## 2018-02-06 RX ADMIN — MIDAZOLAM 1 MG: 1 INJECTION INTRAMUSCULAR; INTRAVENOUS at 07:50

## 2018-02-06 RX ADMIN — FENTANYL CITRATE 75 MCG: 50 INJECTION, SOLUTION INTRAMUSCULAR; INTRAVENOUS at 09:12

## 2018-02-06 RX ADMIN — Medication 3000 UNITS: at 11:13

## 2018-02-06 RX ADMIN — FENTANYL CITRATE 50 MCG: 50 INJECTION, SOLUTION INTRAMUSCULAR; INTRAVENOUS at 07:49

## 2018-02-06 RX ADMIN — FENTANYL CITRATE 50 MCG: 50 INJECTION, SOLUTION INTRAMUSCULAR; INTRAVENOUS at 09:00

## 2018-02-06 RX ADMIN — OXYCODONE HYDROCHLORIDE 5 MG: 5 TABLET ORAL at 21:13

## 2018-02-06 RX ADMIN — ACETAMINOPHEN 1000 MG: 10 INJECTION, SOLUTION INTRAVENOUS at 11:48

## 2018-02-06 RX ADMIN — ONDANSETRON 4 MG: 2 INJECTION INTRAMUSCULAR; INTRAVENOUS at 11:54

## 2018-02-06 RX ADMIN — MANNITOL 12.5 G: 20 INJECTION, SOLUTION INTRAVENOUS at 10:51

## 2018-02-06 RX ADMIN — FENTANYL CITRATE 25 MCG: 50 INJECTION, SOLUTION INTRAMUSCULAR; INTRAVENOUS at 13:40

## 2018-02-06 RX ADMIN — PROPOFOL 100 MG: 10 INJECTION, EMULSION INTRAVENOUS at 09:13

## 2018-02-06 RX ADMIN — Medication 0.3 MG: at 15:17

## 2018-02-06 RX ADMIN — RANITIDINE 150 MG: 150 TABLET, FILM COATED ORAL at 20:14

## 2018-02-06 RX ADMIN — MIDAZOLAM 2 MG: 1 INJECTION INTRAMUSCULAR; INTRAVENOUS at 09:02

## 2018-02-06 RX ADMIN — Medication 0.2 MG: at 13:43

## 2018-02-06 RX ADMIN — LEVOFLOXACIN 500 MG: 5 INJECTION, SOLUTION INTRAVENOUS at 08:16

## 2018-02-06 RX ADMIN — FENTANYL CITRATE 50 MCG: 50 INJECTION, SOLUTION INTRAMUSCULAR; INTRAVENOUS at 10:41

## 2018-02-06 RX ADMIN — KETOROLAC TROMETHAMINE 15 MG: 30 INJECTION, SOLUTION INTRAMUSCULAR at 22:17

## 2018-02-06 RX ADMIN — ROCURONIUM BROMIDE 20 MG: 10 INJECTION INTRAVENOUS at 10:11

## 2018-02-06 ASSESSMENT — PAIN DESCRIPTION - DESCRIPTORS
DESCRIPTORS: ACHING

## 2018-02-06 ASSESSMENT — LIFESTYLE VARIABLES: TOBACCO_USE: 0

## 2018-02-06 NOTE — IP AVS SNAPSHOT
MRN:[id number]                      After Visit Summary   2/6/2018    Shantanu Martinez    MRN: [id number]           Thank you!     Thank you for choosing [facility] for your care. Our goal is always to provide you with excellent care. Hearing back from our patients is one way we can continue to improve our services. Please take a few minutes to complete the written survey that you may receive in the mail after you visit with us. Thank you!        Patient Information     Date Of Birth          1990        Designated Caregiver       Most Recent Value    Caregiver    Will someone help with your care after discharge? yes    Name of designated caregiver Lashawn Martinez, wife    Phone number of caregiver [phone]    Caregiver address [address]      About your hospital stay     You were admitted on:  February 6, 2018 You last received care in the:  [address] [facility]    You were discharged on:  February 9, 2018        Reason for your hospital stay       Kidney donation.                  Who to Call     For medical emergencies, please call 911.  For non-urgent questions about your medical care, please call your primary care provider or clinic, [phone]  For questions related to your surgery, please call your surgery clinic        Attending Provider     Provider Specialty    Osbaldo Hooker MD Transplant       Primary Care Provider Office Phone # Fax #    Michael Zelaya [phone]       When to contact your care team       Your transplant coordinator if you have any of the following:   Swelling, oozing, worsening pain, unusual redness around the incision, or if:  Fever of 101.5 F or higher   Increasing abdominal pain   Nausea or vomiting   Severe diarrhea, bloating, or constipation     Any concerns or questions, please call your Transplant coordinator:    Phone: [phone].  If they are not available, the on call coordinator/MD will help you with your  concern.                  After Care Instructions     Activity       Your activity upon discharge: Don't lift anything heavier than 10 pounds for 6 weeks.   Walk regularly.    OK to drive only after no longer taking narcotics AND you feel comfortable working the breaks/clutch suddenly if needed.  Limit sports and strenuous activities/'core' exercises for 6 weeks.  If you experience pain after exertion, try an ice pack or warm pack to the area.   Wear abdominal binder for comfort if you desire, but only when out of bed.    You have been instructed to avoid NSAIDs (ibuprofen/aspirin like medications) as these medications may affect the remaining kidney. Take other medications as prescribed.            Diet       Follow this diet upon discharge: Keep yourself well hydrated (goal 1500 ml/day).   Eat lightly the first week as tolerated and avoid constipating foods.  If you are constipated, take a stool softener like Senna/Colace/Dulcolax/Miralax.            Wound care and dressings       Instructions to care for your wound at home: OK to shower. Gently wash around your incisions with soap and water.   Don't bathe/submerge incisions until glue is off and any openings are closed.  Wear loose-fitting clothes. This will help you be more comfortable and cause less irritation around your incisions.                  Follow-up Appointments     Adult [facility] Follow-up and recommended labs and tests       Follow up with Dr. Hooker in Transplant Clinic in 2-3 weeks.            Follow Up and recommended labs and tests       Follow up with Dr. Hooker in Transplant Clinic in 2-3 weeks.  If you need to change your appointment please contact your coordinator at [phone].                  Your next 10 appointments already scheduled     Feb 26, 2018  2:45 PM CST   (Arrive by 2:30 PM)   Kidney Post Op with Osbaldo Hooker MD   [facility] Solid Organ Transplant ([facility] [place])    [address]  [address]  [facility] [address] [phone]              Pending Results     Date and Time Order Name Status Description    2018 0805 Urine Culture Aerobic Bacterial Preliminary             Statement of Approval     Ordered          18 1550  I have reviewed and agree with all the recommendations and orders detailed in this document.  EFFECTIVE NOW     Approved and electronically signed by:  Nataliia Desai PA-C             Admission Information     Date & Time Provider Department Dept. Phone    2018 Osbaldo Hooker MD [address] [facility] [phone]      Your Vitals Were     Blood Pressure Pulse Temperature Respirations Height Weight    119/80 (BP Location: Right arm) 72 98.2  F (36.8  C) (Oral) 18 1.727 m (5' 8\") 65.1 kg (143 lb 9.6 oz)    Pulse Oximetry BMI (Body Mass Index)                97% 21.83 kg/m2          [employer] Information     [employer] lets you send messages to your doctor, view your test results, renew your prescriptions, schedule appointments and more. To sign up, go to [email or web address] . Click on \"Log in\" on the left side of the screen, which will take you to the Welcome page. Then click on \"Sign up Now\" on the right side of the page.     You will be asked to enter the access code listed below, as well as some personal information. Please follow the directions to create your username and password.     Your access code is: [email or web address]  Expires: 2018  1:44 PM     Your access code will  in 90 days. If you need help or a new code, please call your [facility] clinic or [phone].        Care EveryWhere ID     This is your Care EveryWhere ID. This could be used by other organizations to access your [facility] medical records  [id number]        Equal Access to Services     FRANK MIKE : Hemant Leblanc, waangieda haile, qaybta kaalmadee pascual. So [facility] [phone].    ATENCIÓN: Si donis hernandez " biggs disposición servicios gratuitos de asistencia lingüística. Melly serra [phone].    We comply with applicable federal civil rights laws and [place] laws. We do not discriminate on the basis of race, color, national origin, age, disability, sex, sexual orientation, or gender identity.               Review of your medicines      START taking        Dose / Directions    acetaminophen 325 MG tablet   Commonly known as:  TYLENOL        Dose:  650 mg   Take 2 tablets (650 mg) by mouth every 4 hours as needed for other (multimodal surgical pain management along with NSAIDS and opioid medication as indicated based on pain control and physical function.)   Quantity:  50 tablet   Refills:  0       ondansetron 4 MG ODT tab   Commonly known as:  ZOFRAN-ODT        Dose:  4 mg   Take 1 tablet (4 mg) by mouth every 6 hours as needed for nausea or vomiting   Quantity:  20 tablet   Refills:  1       oxyCODONE IR 5 MG tablet   Commonly known as:  ROXICODONE        Dose:  5 mg   Take 1 tablet (5 mg) by mouth every 3 hours as needed for other (pain control or improvement in physical function. Hold dose for analgesic side effects.)   Quantity:  20 tablet   Refills:  0       polyethylene glycol Packet   Commonly known as:  MIRALAX/GLYCOLAX        Dose:  17 g   Take 17 g by mouth 2 times daily   Quantity:  7 packet   Refills:  1       scopolamine 72 hr patch   Commonly known as:  TRANSDERM        Dose:  1 patch   Start taking on:  2/12/2018   Place 1 patch onto the skin every 72 hours   Quantity:  3 patch   Refills:  1       senna-docusate 8.6-50 MG per tablet   Commonly known as:  SENOKOT-S;PERICOLACE        Dose:  2 tablet   Take 2 tablets by mouth 2 times daily   Quantity:  50 tablet   Refills:  1         CONTINUE these medicines which have NOT CHANGED        Dose / Directions    multivitamin, therapeutic with minerals Tabs tablet        Dose:  1 tablet   Take 1 tablet by mouth daily   Refills:  0            Where to get your  medicines      These medications were sent to [facility] - [place], [place] - [address]  [address], [facility] [place] [address]     Phone:  [phone]     ondansetron 4 MG ODT tab    polyethylene glycol Packet    scopolamine 72 hr patch    senna-docusate 8.6-50 MG per tablet         Some of these will need a paper prescription and others can be bought over the counter. Ask your nurse if you have questions.     Bring a paper prescription for each of these medications     acetaminophen 325 MG tablet    oxyCODONE IR 5 MG tablet                Protect others around you: Learn how to safely use, store and throw away your medicines at www.disposemymeds.org.        Information about OPIOIDS     PRESCRIPTION OPIOIDS: WHAT YOU NEED TO KNOW    Prescription opioids can be used to help relieve moderate to severe pain and are often prescribed following a surgery or injury, or for certain health conditions. These medications can be an important part of treatment but also come with serious risks. It is important to work with your health care provider to make sure you are getting the safest, most effective care.    WHAT ARE THE RISKS AND SIDE EFFECTS OF OPIOID USE?  Prescription opioids carry serious risks of addiction and overdose, especially with prolonged use. An opioid overdose, often marked by slowed breathing can cause sudden death. The use of prescription opioids can have a number of side effects as well, even when taken as directed:      Tolerance - meaning you might need to take more of a medication for the same pain relief    Physical dependence - meaning you have symptoms of withdrawal when a medication is stopped    Increased sensitivity to pain    Constipation    Nausea, vomiting, and dry mouth    Sleepiness and dizziness    Confusion    Depression    Low levels of testosterone that can result in lower sex drive, energy, and strength    Itching and sweating    RISKS ARE GREATER  WITH:    History of drug misuse, substance use disorder, or overdose    Mental health conditions (such as depression or anxiety)    Sleep apnea    Older age (65 years or older)    Pregnancy    Avoid alcohol while taking prescription opioids.   Also, unless specifically advised by your health care provider, medications to avoid include:    Benzodiazepines (such as Xanax or Valium)    Muscle relaxants (such as Soma or Flexeril)    Hypnotics (such as Ambien or Lunesta)    Other prescription opioids    KNOW YOUR OPTIONS:  Talk to your health care provider about ways to manage your pain that do not involve prescription opioids. Some of these options may actually work better and have fewer risks and side effects:    Pain relievers such as acetaminophen, ibuprofen, and naproxen    Some medications that are also used for depression or seizures    Physical therapy and exercise    Cognitive behavioral therapy, a psychological, goal-directed approach, in which patients learn how to modify physical, behavioral, and emotional triggers of pain and stress    IF YOU ARE PRESCRIBED OPIOIDS FOR PAIN:    Never take opioids in greater amounts or more often than prescribed    Follow up with your primary health care provider and work together to create a plan on how to manage your pain.    Talk about ways to help manage your pain that do not involve prescription opioids    Talk about all concerns and side effects    Help prevent misuse and abuse    Never sell or share prescription opioids    Never use another person's prescription opioids    Store prescription opioids in a secure place and out of reach of others (this may include visitors, children, friends, and family)    Visit www.cdc.gov/drugoverdose to learn about risks of opioid abuse and overdose    If you believe you may be struggling with addiction, tell your health care provider and ask for guidance or call [facility]'s National Helpline at 1-750-546-HELP    LEARN MORE /  www.cdc.gov/drugoverdose/prescribing/guideline.html    Safely dispose of unused prescription opioids: Find your local drug take-back programs and more information about the importance of safe disposal at www.doseofreality.[place].gov             Medication List: This is a list of all your medications and when to take them. Check marks below indicate your daily home schedule. Keep this list as a reference.      Medications           Morning Afternoon Evening Bedtime As Needed    acetaminophen 325 MG tablet   Commonly known as:  TYLENOL   Take 2 tablets (650 mg) by mouth every 4 hours as needed for other (multimodal surgical pain management along with NSAIDS and opioid medication as indicated based on pain control and physical function.)   Last time this was given:  650 mg on 2/9/2018  1:55 PM                                multivitamin, therapeutic with minerals Tabs tablet   Take 1 tablet by mouth daily                                ondansetron 4 MG ODT tab   Commonly known as:  ZOFRAN-ODT   Take 1 tablet (4 mg) by mouth every 6 hours as needed for nausea or vomiting   Last time this was given:  4 mg on 2/8/2018  1:48 PM                                oxyCODONE IR 5 MG tablet   Commonly known as:  ROXICODONE   Take 1 tablet (5 mg) by mouth every 3 hours as needed for other (pain control or improvement in physical function. Hold dose for analgesic side effects.)   Last time this was given:  10 mg on 2/9/2018 12:22 AM                                polyethylene glycol Packet   Commonly known as:  MIRALAX/GLYCOLAX   Take 17 g by mouth 2 times daily   Last time this was given:  17 g on 2/9/2018  1:12 PM                                scopolamine 72 hr patch   Commonly known as:  TRANSDERM   Place 1 patch onto the skin every 72 hours   Start taking on:  2/12/2018   Last time this was given:  1 patch on 2/9/2018  1:11 PM                                senna-docusate 8.6-50 MG per tablet   Commonly known as:   SENOKOT-S;PERICOLACE   Take 2 tablets by mouth 2 times daily   Last time this was given:  2 tablets on 2/8/2018  8:29 PM

## 2018-02-06 NOTE — ANESTHESIA PREPROCEDURE EVALUATION
Anesthesia Evaluation     . Pt has had prior anesthetic.     No history of anesthetic complications          ROS/MED HX    ENT/Pulmonary: Comment: TEF s/p repair    (+)asthma (hx as child, no issues for years) , . .   (-) tobacco use and sleep apnea   Neurologic:  - neg neurologic ROS     Cardiovascular:     (+) ----. : . . . :. valvular problems/murmurs (benign, none heard today) .       METS/Exercise Tolerance:  >4 METS   Hematologic:         Musculoskeletal:         GI/Hepatic:     (+) GERD (s/p nissen, asymptomatic; c/b dysphagia s/p multiple dilations, no issues with swallowing recently) Other,       Renal/Genitourinary:      (-) renal disease   Endo:         Psychiatric:         Infectious Disease:         Malignancy:         Other:                     Physical Exam  Normal systems: dental    Airway   Mallampati: II  TM distance: >3 FB  Neck ROM: full    Dental     Cardiovascular   Rhythm and rate: regular and normal  (-) no weak pulses and no murmur    Pulmonary    breath sounds clear to auscultation(-) no rhonchi                    Anesthesia Plan      History & Physical Review      ASA Status:  2 .    NPO Status:  > 8 hours    Plan for General and ETT with Intravenous induction. Maintenance will be Balanced.      Additional equipment: 2nd IV GETA. PIVx2. Standard ASA monitors. IV opioids, TAP block. PACU postop    Risks and benefits of anesthetic discussed with patient including sore throat, voice hoarseness, injury to vocal cords, throat, mouth, teeth, tongue, and lips from intubation; nausea/vomiting; cardiac arrest, respiratory complications, MI, stroke, blood clots, death.    Transfusion risks discussed include infection, and complications involving the heart and lungs (transfusion reaction)    Presented opportunity to answer patient and family questions. Questions addressed.        Postoperative Care      Consents  Anesthetic plan, risks, benefits and alternatives discussed with:  Patient.  Use of  blood products discussed: Yes.   Use of blood products discussed with Patient.  Consented to blood products.  .                          .

## 2018-02-06 NOTE — PHARMACY-ADMISSION MEDICATION HISTORY
Admission medication history interview status for the 2/6/2018 admission is complete. See Epic admission navigator for allergy information, pharmacy, prior to admission medications and immunization status.     Medication history interview sources:  patient    Changes made to PTA medication list (reason)  Added: none  Deleted: fluconazole  Changed: none    Additional medication history information (including reliability of information, actions taken by pharmacist): patient was groggy, but able to answer questions. Has not received a flu vaccination this season. Patient took fluconazole as a one-time dose on 2/5/18 for a rash.      Prior to Admission medications    Medication Sig Last Dose Taking? Auth Provider   multivitamin, therapeutic with minerals (THERA-VIT-M) TABS Take 1 tablet by mouth daily 2/4/2018 at Unknown time Yes Reported, Patient         Medication history completed by: Oli Cabrales, PharmD Candidate

## 2018-02-06 NOTE — OR NURSING
Dr Lawson notified of patient's demerol allergy and possible contraindication with fentanyl.  Physician states it is Okay to given fentanyl.

## 2018-02-06 NOTE — ANESTHESIA PROCEDURE NOTES
Peripheral Nerve Block Procedure Note    Staff:     Anesthesiologist:  ADELINA WEAVER    Resident/CRNA:  JODI ROLDAN    Block performed by resident/CRNA in the presence of a teaching physician    Location: Pre-op  Procedure Start/Stop TImes:      2/6/2018 7:30 AM     2/6/2018 7:40 AM    patient identified, IV checked, site marked, risks and benefits discussed, informed consent, monitors and equipment checked, pre-op evaluation, at physician/surgeon's request and post-op pain management      Correct Patient: Yes      Correct Position: Yes      Correct Site: Yes      Correct Procedure: Yes      Correct Laterality:  Yes    Site Marked:  Yes  Procedure details:     Procedure:  TAP    Laterality:  Bilateral    Position:  Supine    Sterile Prep: chloraprep, mask and sterile gloves      Local skin infiltration:  None    Needle:  Insulated and short bevel    Needle gauge:  21    Needle length (mm):  110    Ultrasound: Yes      Ultrasound used to identify targeted nerve, plexus, or vascular structure and placed a needle adjacent to it      Permanent Image entered into patiient's record      Abnormal pain on injection: No      Blood Aspirated: No      Paresthesias:  No    Bleeding at site: No      Bolus via:  Needle    Infusion Method:  Single Shot    Complications:  None  Assessment/Narrative:      Bilateral subcostal TAP blocks done

## 2018-02-06 NOTE — LETTER
Transition Communication Hand-off for Care Transitions to Next Level of Care Provider    Name: Shantanu Martinez  MRN #: [id number]  Primary Care Provider: Michael Zelaya     Primary Clinic: [facility] [address] [place] [id number]     Reason for Hospitalization:  Donor   Encounter for donation of kidney  Admit Date/Time: 2/6/2018  6:21 AM  Discharge Date: 2.9.18  Payor Source: Payor: [employer] / Plan: [employer]ARE [place] / Product Type: HMO /              Reason for Communication Hand-off Referral: Other K donor    Discharge Plan:       Concern for non-adherence with plan of care:   Y/N N  Discharge Needs Assessment:        Follow-up specialty is recommended: Yes    Follow-up plan:  Future Appointments  Date Time Provider Department Center   2/26/2018 2:45 PM Osbaldo Hooker MD [facility]       Any outstanding tests or procedures:              Key Recommendations:      Yane Chris    AVS/Discharge Summary is the source of truth; this is a helpful guide for improved communication of patient story

## 2018-02-06 NOTE — OP NOTE
Transplant Surgery  Operative Note     Procedure Date:  02/06/18    Preoperative Diagnosis:  Healthy kidney donor    Postoperative Diagnosis:  Healthy kidney donor    Procedure:  1. Left Kidney Laparoscopic Hand-Assisted living donor nephrectomy for donation   2. Lysis of adhesions <60 minutes        Secondary Procedure:        Surgeon:    Surgeon(s) and Role:     * Osbaldo Hooker MD - Primary        Fellow/Assistant:     * Aga Donaldson MD - Assisting There was no qualified assistant to assist with this procedure.    Specimen:  Left kidney and ureter    Anesthesia:    General    Urine Output: 860 mls  Estimated Blood Loss: 20 mls   Fluids administered:   Crystalloid (mL) 3.1 l (104.82 fl. oz.)        Intra Op Events: None    Complications: None.    Findings: Flimsy adhesions of omentum to the abdominal wall and spleen in the left upper quadrant..        Donor UNOS ID:    [id number]  Flush Start time:  2/6/2018 11:21 AM    Arterial Clamp:    2/6/2018 11:18 AM  Arterial anatomy:  Single    Venous anatomy:    Single  Ureteral anatomy:   Single     Indication: Shantanu Martinez presents for Left Kidney donation. The patient has undergone a thorough medical and psychosocial evaluation and was found suitable for voluntary kidney donation. Risks and benefits of donation were discussed. The patient expressed understanding, was willing to proceed, and provided informed consent.    Final ABO/Crossmatch verification: Prior to incision, I verified the donor ABO and recipient ABO. I visually verified that the donor identification, blood type, and other vital data were compatible with the recipient.      Operative Procedure:  Shantanu Martinez was transported to the operating room, placed on the operating table in the right lateral decubitus position, and a universal timeout was performed. Sequential compression devices were placed on both lower extremities and general endotracheal anesthesia was induced. The patient was given  IV antibiotics and Puenets catheter.  The abdomen was shaved, prepped, and draped in the usual sterile fashion.    We made a 6 cm upper midline incision and carried down thru the linea alba. The peritoneum was opened under direct vision. The hand port was put into position and a left lower quadrant 10 mm port was placed over a trocar with hand assistance. Pneumoperitoneum with CO2 was provided to 12 mmHg. General survey with the laparoscope revealed no unusual findings. An additional 10 mm port was placed in the left lateral abdomen under direct vision.     We released the left colon from its lateral attachments and rotated medially, revealing the kidney and ureter. The ureter was circumferentially dissected free distally toward the pelvis then kidney taking care to preserve its vasculature. The gonadal vein was identified and dissected enbloc with the ureter, and the proximal gonadal vein was doubly ligated and divided near the insertion into the left renal vein. The left adrenal vein was likewise identified, ligated and divided. The renal vein was then circumferentially cleared of extraneous tissue. The kidney and ureter were dissected free posteriorly from the psoas and multiple lumbar veins were clipped and divided.     We created a plane between the left adrenal gland and the upper pole of the kidney with the harmonic scalpel and the upper pole attachments were released. The anterior and inferior aspects of the renal artery at its origin were cleared of investing lymphatics. The patient was given fluid, mannitol and lasix, and the kidney was then dissected free from its lateral attachments allowing full medial rotation. The remaining lymphatics and fat around the renal artery was cleared. The patient was heparinized and the distal ureter and gonadal vein were clipped and divided. Good urine flow was seen. A laparoscopic CARLOS stapler was fired across the renal artery and then the renal vein.     The kidney was  delivered from the hand port, flushed, and taken to recipient room. Protamine was administered for full heparin reversal. Pneumoperitoneum was reestablished and hemostasis was obtained. Vascular transection sites were visualized and confirmed secure. The colon was placed back in its natural position. The 10 mm port sites were closed with 0-vicryl. The hand port was closed with 0-PDS. Skin incisions were irrigated and closed with 4-0 monocryl and Dermabond. Needle, sponge, and instrument counts were correct x2.  Faculty was present for key portions of the procedure. The patient tolerated the procedure well without apparent complications and was extubated and transferred to PACU in good condition. I was present during the key portions of the procedure, and I was immediately available for the entire procedure.

## 2018-02-06 NOTE — ANESTHESIA POSTPROCEDURE EVALUATION
Patient: Shantanu Martinez    Procedure(s):  Laparoscopic Hand Assisted Living Unrelated Kidney Donor, anesthesia Block - Wound Class: II-Clean Contaminated    Diagnosis:Donor   Diagnosis Additional Information: No value filed.    Anesthesia Type:  No value filed.    Note:  Anesthesia Post Evaluation    Patient location during evaluation: Floor  Patient participation: Able to fully participate in evaluation  Level of consciousness: awake and alert  Pain management: adequate  Airway patency: patent  Cardiovascular status: acceptable  Respiratory status: acceptable  Hydration status: acceptable  PONV: none     Anesthetic complications: None    Comments: C/o shoulder discomfort on floor immediately postop.    Reevaluated day after (2/7/2018), shoulder discomfort resolved. Likely due to positioning and inadequate axillary roll thickness placed.        Last vitals:  Vitals:    02/06/18 1400 02/06/18 1422 02/06/18 1440   BP: 109/71  103/79   Pulse:      Resp: 12  16   Temp:   36.5  C (97.7  F)   SpO2: 96% 95% 98%         Electronically Signed By: Nikolas Jeronimo MD  February 6, 2018  3:09 PM

## 2018-02-06 NOTE — OR NURSING
Patient readied for TAP Block procedure; placed on NIBP monitor, oximetry, cardiac monitor, and 2 L oxygen via Nasal Cannula. Consent signed at 0726.

## 2018-02-06 NOTE — ANESTHESIA CARE TRANSFER NOTE
Patient: Shantanu Martinez    Procedure(s):  Laparoscopic Hand Assisted Living Unrelated Kidney Donor, anesthesia Block - Wound Class: II-Clean Contaminated    Diagnosis: Donor   Diagnosis Additional Information: No value filed.    Anesthesia Type:   No value filed.     Note:  Airway :Face Mask  Patient transferred to:PACU  Comments: Pt stable upon transfer of care. Handoff Report: Identifed the Patient, Identified the Reponsible Provider, Reviewed the pertinent medical history, Discussed the surgical course, Reviewed Intra-OP anesthesia mangement and issues during anesthesia, Set expectations for post-procedure period and Allowed opportunity for questions and acknowledgement of understanding      Vitals: (Last set prior to Anesthesia Care Transfer)    CRNA VITALS  2/6/2018 1220 - 2/6/2018 1255      2/6/2018             Pulse: 93    SpO2: 99 %    Resp Rate (observed): (!)  7                Electronically Signed By: JOSE Koroma CRNA  February 6, 2018  12:55 PM

## 2018-02-06 NOTE — IP AVS SNAPSHOT
[address] [address]    Phone:  [phone]                                       After Visit Summary   2/6/2018    Shantanu Martinez    MRN: [id number]           After Visit Summary Signature Page     I have received my discharge instructions, and my questions have been answered. I have discussed any challenges I see with this plan with the nurse or doctor.    ..........................................................................................................................................  Patient/Patient Representative Signature      ..........................................................................................................................................  Patient Representative Print Name and Relationship to Patient    ..................................................               ................................................  Date                                            Time    ..........................................................................................................................................  Reviewed by Signature/Title    ...................................................              ..............................................  Date                                                            Time

## 2018-02-07 ENCOUNTER — DOCUMENTATION ONLY (OUTPATIENT)
Dept: TRANSPLANT | Facility: CLINIC | Age: 28
End: 2018-02-07

## 2018-02-07 LAB
BUN SERPL-MCNC: 21 MG/DL (ref 7–30)
CREAT SERPL-MCNC: 1.94 MG/DL (ref 0.66–1.25)
GFR SERPL CREATININE-BSD FRML MDRD: 42 ML/MIN/1.7M2
HCT VFR BLD AUTO: 39.4 % (ref 40–53)
HGB BLD-MCNC: 13.6 G/DL (ref 13.3–17.7)

## 2018-02-07 PROCEDURE — 85014 HEMATOCRIT: CPT | Performed by: TRANSPLANT SURGERY

## 2018-02-07 PROCEDURE — 84520 ASSAY OF UREA NITROGEN: CPT | Performed by: TRANSPLANT SURGERY

## 2018-02-07 PROCEDURE — 36415 COLL VENOUS BLD VENIPUNCTURE: CPT | Performed by: TRANSPLANT SURGERY

## 2018-02-07 PROCEDURE — 82565 ASSAY OF CREATININE: CPT | Performed by: TRANSPLANT SURGERY

## 2018-02-07 PROCEDURE — 25000128 H RX IP 250 OP 636: Performed by: TRANSPLANT SURGERY

## 2018-02-07 PROCEDURE — 85018 HEMOGLOBIN: CPT | Performed by: TRANSPLANT SURGERY

## 2018-02-07 PROCEDURE — 25000132 ZZH RX MED GY IP 250 OP 250 PS 637: Performed by: TRANSPLANT SURGERY

## 2018-02-07 PROCEDURE — 99231 SBSQ HOSP IP/OBS SF/LOW 25: CPT | Performed by: NURSE PRACTITIONER

## 2018-02-07 PROCEDURE — 12000026 ZZH R&B TRANSPLANT

## 2018-02-07 RX ORDER — SODIUM CHLORIDE, SODIUM LACTATE, POTASSIUM CHLORIDE, CALCIUM CHLORIDE 600; 310; 30; 20 MG/100ML; MG/100ML; MG/100ML; MG/100ML
INJECTION, SOLUTION INTRAVENOUS CONTINUOUS
Status: DISCONTINUED | OUTPATIENT
Start: 2018-02-07 | End: 2018-02-07

## 2018-02-07 RX ADMIN — OXYCODONE HYDROCHLORIDE 5 MG: 5 TABLET ORAL at 15:18

## 2018-02-07 RX ADMIN — OXYCODONE HYDROCHLORIDE 5 MG: 5 TABLET ORAL at 07:40

## 2018-02-07 RX ADMIN — SENNOSIDES AND DOCUSATE SODIUM 2 TABLET: 8.6; 5 TABLET ORAL at 19:33

## 2018-02-07 RX ADMIN — KETOROLAC TROMETHAMINE 15 MG: 30 INJECTION, SOLUTION INTRAMUSCULAR at 10:31

## 2018-02-07 RX ADMIN — RANITIDINE 150 MG: 150 TABLET, FILM COATED ORAL at 07:40

## 2018-02-07 RX ADMIN — OXYCODONE HYDROCHLORIDE 5 MG: 5 TABLET ORAL at 11:43

## 2018-02-07 RX ADMIN — OXYCODONE HYDROCHLORIDE 5 MG: 5 TABLET ORAL at 19:41

## 2018-02-07 RX ADMIN — RANITIDINE 150 MG: 150 TABLET, FILM COATED ORAL at 19:32

## 2018-02-07 RX ADMIN — KETOROLAC TROMETHAMINE 15 MG: 30 INJECTION, SOLUTION INTRAMUSCULAR at 03:46

## 2018-02-07 RX ADMIN — OXYCODONE HYDROCHLORIDE 5 MG: 5 TABLET ORAL at 04:30

## 2018-02-07 RX ADMIN — OXYCODONE HYDROCHLORIDE 10 MG: 5 TABLET ORAL at 23:14

## 2018-02-07 RX ADMIN — SODIUM CHLORIDE, POTASSIUM CHLORIDE, SODIUM LACTATE AND CALCIUM CHLORIDE: 600; 310; 30; 20 INJECTION, SOLUTION INTRAVENOUS at 06:08

## 2018-02-07 RX ADMIN — SENNOSIDES AND DOCUSATE SODIUM 2 TABLET: 8.6; 5 TABLET ORAL at 07:40

## 2018-02-07 RX ADMIN — OXYCODONE HYDROCHLORIDE 5 MG: 5 TABLET ORAL at 01:23

## 2018-02-07 ASSESSMENT — ACTIVITIES OF DAILY LIVING (ADL): COGNITION: 0 - NO COGNITION ISSUES REPORTED

## 2018-02-07 ASSESSMENT — PAIN DESCRIPTION - DESCRIPTORS
DESCRIPTORS: ACHING

## 2018-02-07 NOTE — PROGRESS NOTES
"REGIONAL ANESTHESIA PAIN SERVICE  SUBJECTIVE  Interval history: Pt reports pain well controlled with current analgesics (see below) and nerve block.  Currently rating postop abdominal pain 4/10 at rest and 5/10 with activity.  Patient tolerating regular diet,  denies nausea.  Walking in halls x 1 yesterday and x 1 today.  Denies any weakness, paresthesias, circumoral numbness, metallic taste or tinnitus; voiding without difficulty.     Clinically Aligned Pain Assessment (CAPA):  Comfort (How is your pain?): Tolerable with discomfort  Change in Pain (Since your last medication/intervention?): About the same  Pain Control (How are your pain treatments working?):  Fully effective pain control    Medications related to Pain Management (Future)    Start     Dose/Rate Route Frequency Ordered Stop    02/07/18 0800  senna-docusate (SENOKOT-S;PERICOLACE) 8.6-50 MG per tablet 2 tablet      2 tablet Oral 2 TIMES DAILY 02/06/18 1441      02/06/18 1441  bupivacaine liposome (EXPAREL) LONG ACTING injection was administered into the infiltration site to produce postsurgical analgesia. Duration of action is up to 72 hours, and other \"radha\" medications should not be given for 96 hours with the exception of the lidocaine 5% patch (LIDODERM) and the lidocaine 10mg in potassium infusions. This entry is for INFORMATION ONLY.       Does not apply CONTINUOUS PRN 02/06/18 1441 02/10/18 1440    02/06/18 1441  oxyCODONE IR (ROXICODONE) tablet 5-10 mg      5-10 mg Oral EVERY 3 HOURS PRN 02/06/18 1441      02/06/18 1441  acetaminophen (TYLENOL) tablet 650 mg      650 mg Oral EVERY 4 HOURS PRN 02/06/18 1441            OBJECTIVE:    /62 (BP Location: Left arm)  Pulse 58  Temp 97.5  F (36.4  C) (Axillary)  Resp 20  Ht 1.727 m (5' 8\")  Wt 65.1 kg (143 lb 9.6 oz)  SpO2 100%  BMI 21.83 kg/m2  Exam:  General: alert and no distress  Neuro: Strength 5/5 and grossly symmetric BLE     ASSESSMENT/PLAN:    Shantanu Martinez is a 27 year old male " donor, now POD #1 s/p  LAPAROSCOPIC DONOR HAND ASSISTED KIDNEY LIVING UNRELATED with single shot injection subcostal transversus abdominis plane (TAP) nerve block.  Total bupivacaine 0.25% with epinephrine 1:200,000 20 mL, then liposomal (long-acting) bupivacaine (Exparel) 1.3% 20mL administered 2/6/18 for postop pain control.  Pt is ambulating without difficulty.  No weakness or paresthesias.  No evidence of adverse side effects associated with nerve block injections.  Pt acheiving adequate pain control, with nerve block and oral analgesics.  Anticipate up to 72 hours of pain control with long-acting local anesthetic. Additionally, pt will continue to require multimodal analgesia for visceral/muscle/musculoskeletal pain not controlled with long-acting local anesthetic.    - NO other local anesthetic use within 96 hours of liposomal bupivacaine (Exparel)  - patient received verbal and written instructions about liposomal bupivacaine and counseling about pharmacologic and nonpharmacologic measures for acute postoperative pain management  - please call RAPS if questions or concerns    JOSE Ramirez [facility]  Regional Anesthesia Pain Service (RAPS)  2/7/2018 12:39 PM    RAPS Contact Info (for in-house use only):  Job code ID: [id number]   [place] 0599  Peds [phone]  Jorge phone: dial [phone], enter jobcode ID, then enter call-back number.    Text: Use [employer] on the Intranet <Paging/Directory> tab and enter Jobcode ID.   If no call back at any time, contact the hospital  and ask for RAPS attending or backup

## 2018-02-07 NOTE — PROGRESS NOTES
Transplant Surgery  Inpatient Daily Progress Note  2018    Subjective: 27 year old male with PMH significant for asthma, heart murmur and hydrocele s/p right hydrocelectomy in . Admitted following left donor nephrectomy on 2018.     Objective:     HEENT: sclera anicteric, mmm, conjunctiva pink   CV: NSR   Lungs: NLB on room air  Abdomen: soft, slightly distended, and appropriately tender   Incision(s): C/D/I with no rehana-incisional eccymoses  Extremities: no cyanosis or edema     All laboratory data related to this surgery reviewed  Lab Results   Component Value Date    WBC 4.0 2017    HGB 13.6 2018    HCT 39.4 (L) 2018     2017     2017    POTASSIUM 4.0 2017    CHLORIDE 105 2017    CO2 28 2017    BUN 21 2018    CR 1.94 (H) 2018    GLC 93 2017    SED 6 2013    AST 25 2017    ALT 28 2017    ALKPHOS 54 2017    BILITOTAL 1.3 2017    INR 1.10 2017       Assessment: POD #1, doing well.    Plan:   1. Encouraged ambulation and ISB   2. Continue GI and DVT prophylaxis. Bowel regimen. Positive flatus, no BM.   3.  Pain control: pain well controlled with Toradol every 6 hours, Oxycodone 5-10 mg every 3 hours prn and Tylenol 650 mg every 4 hours prn. Will d/c IV dilaudid as it hasn't been required.   4. Good po intake. Will decrease LR from 100 to 50 cc/hour. Plan to d/c by this afternoon if PO intake remains adequate.   5. Remove Puentes  6. Discharge planninA, possible d/c tomorrow    Nataliia Desai PA-C [id number]

## 2018-02-07 NOTE — PROGRESS NOTES
I visited with the patient today on the unit 7A.  He is one day post donation.  He was awake, laying in his bed.  He reports good pain control.  He stated he has gone for 3 walks.  He reports urinating without difficulty.  He reports drinking several glasses of water without having issues of nausea.  He said he was passing gas.  He will see how he is feeling as far as driving directly home.  He may stay in the [facility] one more day.  We talked about the appointment in the clinic to see his surgeon.  He is okay driving back for that appointment.  I gave him the information of discharge milestones, and the education sheet of common questions donors have after surgery.  I reviewed the contact phone number sheet and reviewed that there are nurses and staff available after hours or on the weekend if he should have any questions.  Organ specific education completed, and discharge planning has been conducted with multidisciplinary transplant care team including physicians, pharmacy, nutrition, and social work.

## 2018-02-07 NOTE — PLAN OF CARE
"Problem: Patient Care Overview  Goal: Plan of Care/Patient Progress Review  Outcome: No Change  /60  Pulse 61  Temp 98.3  F (36.8  C) (Oral)  Resp 17  Ht 1.727 m (5' 8\")  Wt 61.2 kg (134 lb 14.7 oz)  SpO2 96%  BMI 20.51 kg/m2    Afebrile, BPs soft. OVSS on RA. Capno on - IPI 8-10. Pt endorses incisional pain, oxycodone 5mg given x3 and scheduled Toradol with some relief. Incision c/d/i liquid bandage (abdominal binder in place). PIV with MIVF @ 100ml/hr. Puentes patent with excellent OP. No reported BM - bowel sounds hypoactive. Pt up with assist x1 - went for a short walk. Tolerated a clear liquid diet overnight. Pt will try breakfast this AM, denies nausea. Will continue to monitor. Call light in reach, continue with POC.         "

## 2018-02-07 NOTE — PROGRESS NOTES
"LIVING DONOR SOCIAL WORK AND INDEPENDENT LIVING DONOR ADVOCATE NOTE:  D:  Mr. Shantanu Martinez is a living kidney donor, POD #1.  I:  I met with him at bedside to thank him for his donation and to assess for any psychosocial needs and to assist with discharge planning.  I assessed the patient's mood/affect, plans for recovery, and any feelings of regret/remorse regarding donation.   A:  Mr. Martinez is sitting up in his bed with his wife, Lashawn, and their 15 month old daughter visiting.   He appears to be in a positive mood and affect is congruent.  He states that pain is well controlled.  He reports that his recipient is doing well so far.  He has not been able to see the recipient yet, but plans to later today.  Patient describes donation recovery as \"good\" so far.  He and I discussed how to reach me should he have any post operative psychosocial needs.  He reports no feelings of regret or remorse about donation.  We discussed using a donor keith through the [facility] foundation to help offset some living expenses while he is out of work recovering from surgery.  He denies any discharge planning needs at this time.  Patient plans to discharge to [facility] in [place] very near the hospital with the care and support of his wife.  He will then travel back to [place], [place], about 4 hours by car.   P: Donor /ALYSON will remain available to assist with any psychosocial and advocacy needs, both inpatient and outpatient, as needed.  Patient is aware of follow-up recommendations and has my contact information.    LEELEE Pardo, [facility]  Clinical  and Independent Donor Advocate  [facility]  Pager:  [phone]  Direct:  [phone]    "

## 2018-02-07 NOTE — PHARMACY-CONSULT NOTE
D/I: 27 year old male s/p kidney. donation surgery on 2/6/18.  Medications have been reviewed by the pharmacist for efficacy, appropriate dose, medication interactions and potential adverse effects.      A: Medications reviewed for this patient as above. No medication issues were noted.  P: Pharmacy will continue to monitor for any potential medication issues, and will make recommendations as appropriate. Medication therapy needs for discharge planning will continue to be addressed throughout the current admission via multidisciplinary rounds and order review.    Doris Sexton, Pharm.D., [facility]  Pager [phone]

## 2018-02-07 NOTE — PROGRESS NOTES
POST-OP STATUS  D:  Patient arrived to 7A from PACU at 1440 s/p kidney donor.  AVSS and reporting abdominal pain.  Patient resting in bed since arrival.  Family intermittently present and supportive.  Incision dermabonded CDI with abdominal binder on.  Tolerating ice chips and sips of clears.  Puentes patent with good output.  Hgb=14.7/Hct=41.6 (Dr. Donaldson paged at [phone] with results).    I:  Post-op vitals obtained.  Prn IV dilaudid and oxycodone given per patient request for pain. Scheduled meds given as ordered.    A:  Patient stable and resting in bed; states some relief with pain intervention.    P:  Continue to monitor, treat as ordered, notify team with changes.  Plan to dangle or walk this evening.

## 2018-02-07 NOTE — PLAN OF CARE
Problem: Patient Care Overview  Goal: Plan of Care/Patient Progress Review    AVSS, reporting pain, and states relief with current pain regimen (scheduled toradol and prn oxycodone).  Patient up independently, ambulating in bazan, voiding after vick dc'd this AM, passing flatus, no bowel movement, and tolerating regular diet without nausea.  Scheduled meds given as ordered.  Continue to monitor, treat as ordered, notify team with changes.  Probable discharge tomorrow.

## 2018-02-08 PROCEDURE — 12000025 ZZH R&B TRANSPLANT INTERMEDIATE

## 2018-02-08 PROCEDURE — 25000132 ZZH RX MED GY IP 250 OP 250 PS 637: Performed by: TRANSPLANT SURGERY

## 2018-02-08 PROCEDURE — 25000132 ZZH RX MED GY IP 250 OP 250 PS 637

## 2018-02-08 PROCEDURE — 25000125 ZZHC RX 250: Performed by: TRANSPLANT SURGERY

## 2018-02-08 RX ORDER — POLYETHYLENE GLYCOL 3350 17 G/17G
17 POWDER, FOR SOLUTION ORAL 2 TIMES DAILY
Status: DISCONTINUED | OUTPATIENT
Start: 2018-02-08 | End: 2018-02-08

## 2018-02-08 RX ORDER — POLYETHYLENE GLYCOL 3350 17 G/17G
17 POWDER, FOR SOLUTION ORAL 2 TIMES DAILY
Status: DISCONTINUED | OUTPATIENT
Start: 2018-02-08 | End: 2018-02-09 | Stop reason: HOSPADM

## 2018-02-08 RX ORDER — BISACODYL 10 MG
10 SUPPOSITORY, RECTAL RECTAL DAILY PRN
Status: DISCONTINUED | OUTPATIENT
Start: 2018-02-08 | End: 2018-02-09 | Stop reason: HOSPADM

## 2018-02-08 RX ADMIN — POLYETHYLENE GLYCOL 3350 17 G: 17 POWDER, FOR SOLUTION ORAL at 15:59

## 2018-02-08 RX ADMIN — OXYCODONE HYDROCHLORIDE 5 MG: 5 TABLET ORAL at 02:45

## 2018-02-08 RX ADMIN — OXYCODONE HYDROCHLORIDE 10 MG: 5 TABLET ORAL at 07:47

## 2018-02-08 RX ADMIN — ACETAMINOPHEN 650 MG: 325 TABLET, FILM COATED ORAL at 07:47

## 2018-02-08 RX ADMIN — SENNOSIDES AND DOCUSATE SODIUM 2 TABLET: 8.6; 5 TABLET ORAL at 20:29

## 2018-02-08 RX ADMIN — RANITIDINE 150 MG: 150 TABLET, FILM COATED ORAL at 20:29

## 2018-02-08 RX ADMIN — ONDANSETRON 4 MG: 4 TABLET, ORALLY DISINTEGRATING ORAL at 13:48

## 2018-02-08 RX ADMIN — SENNOSIDES AND DOCUSATE SODIUM 2 TABLET: 8.6; 5 TABLET ORAL at 07:47

## 2018-02-08 RX ADMIN — RANITIDINE 150 MG: 150 TABLET, FILM COATED ORAL at 07:47

## 2018-02-08 RX ADMIN — OXYCODONE HYDROCHLORIDE 10 MG: 5 TABLET ORAL at 13:02

## 2018-02-08 RX ADMIN — OXYCODONE HYDROCHLORIDE 10 MG: 5 TABLET ORAL at 20:29

## 2018-02-08 RX ADMIN — OXYCODONE HYDROCHLORIDE 5 MG: 5 TABLET ORAL at 16:03

## 2018-02-08 RX ADMIN — BISACODYL 10 MG: 10 SUPPOSITORY RECTAL at 15:59

## 2018-02-08 RX ADMIN — ACETAMINOPHEN 650 MG: 325 TABLET, FILM COATED ORAL at 16:03

## 2018-02-08 ASSESSMENT — PAIN DESCRIPTION - DESCRIPTORS: DESCRIPTORS: SORE

## 2018-02-08 NOTE — PHARMACY-TRANSPLANT NOTE
[facility] Organ Transplant Donor Prior to Discharge Note  27 year old male s/p kidney donation surgery on 2/6/18.     Pharmacy has monitored for any potential medication issues.  In anticipation for discharge, medication therapy needs have been addressed daily throughout the current admission via multidisciplinary rounds and/or discussions, order verification, daily clinical pharmacy review, and communication with prescribers.    Kishore Reyes, PharmD -- pager [phone]

## 2018-02-08 NOTE — PLAN OF CARE
Problem: Patient Care Overview  Goal: Plan of Care/Patient Progress Review  Outcome: Improving  VSS on RA. Complained of incisional pain relieved with PRN oxycodone x3. Denied any nausea. Tolerating a regular diet but without an appetite.  Voiding adequate amounts with clear, yellow urine. No BM this shift but was able to pass flatus and belched on several occurences while in room. Incision is approximated with scant drainage. Up ad roselia and ambulated to bathroom throughout shift. Uses call light appropriately, slept between cares.  Continuing to monitor.

## 2018-02-08 NOTE — PROGRESS NOTES
Transplant Surgery  Inpatient Daily Progress Note  2018    Subjective: 27 year old male with PMH significant for asthma, heart murmur and hydrocele s/p right hydrocelectomy in . Admitted following left donor nephrectomy on 2018.     Objective:     HEENT: sclera anicteric, mmm, conjunctiva pink   CV: NSR   Lungs: NLB on room air  Abdomen: soft, slightly distended, and appropriately tender   Incision(s): C/D/I with no rehana-incisional eccymoses  Extremities: no cyanosis or edema     All laboratory data related to this surgery reviewed  Lab Results   Component Value Date    WBC 4.0 2017    HGB 13.6 2018    HCT 39.4 (L) 2018     2017     2017    POTASSIUM 4.0 2017    CHLORIDE 105 2017    CO2 28 2017    BUN 21 2018    CR 1.94 (H) 2018    GLC 93 2017    SED 6 2013    AST 25 2017    ALT 28 2017    ALKPHOS 54 2017    BILITOTAL 1.3 2017    INR 1.10 2017       Assessment: POD #2, doing well. Feeling slightly distended and nauseous, not passed stool but passing flatus freely.     Plan:   1. Encouraged ambulation and ISB   2. Continue GI and DVT prophylaxis. Bowel regimen. Positive flatus, no BM. Will give a dose of Miralax and a suppository  3.  Pain control: pain well controlled with Toradol every 6 hours, Oxycodone 5-10 mg every 3 hours prn and Tylenol 650 mg every 4 hours prn. Will d/c IV dilaudid as it hasn't been required.   4. Good po intake.Stopped IV fluids yesterday. Plan to d/c tomorrow if feels better  5. Remove Puentes on POD 1  6. Discharge planninA, possible d/c tomorrow    Aga Donaldson  Fellow, Transplant surgery  Pager: [phone]

## 2018-02-08 NOTE — PLAN OF CARE
"Problem: Patient Care Overview  Goal: Plan of Care/Patient Progress Review  Outcome: No Change  Blood pressure 122/78, pulse 85, temperature 97.6  F (36.4  C), temperature source Oral, resp. rate 16, height 1.727 m (5' 8\"), weight 65.1 kg (143 lb 9.6 oz), SpO2 97 %.  Pt. Has c/o feeling a little light-headed at times today. Says he feels like he could throw up but because of his anatomy ([place]) he cant'. Given Zofran ODT with some relief; now sleeping. Pain managed with prn Oxycodone.    Constipated; says he usually only has a BM QOD.  Urine output is satisfactory. Drinking adequate amount of fluids. Poor PO intake.  Plan: miralax and suppository ordered.         "

## 2018-02-09 VITALS
SYSTOLIC BLOOD PRESSURE: 119 MMHG | BODY MASS INDEX: 21.76 KG/M2 | HEIGHT: 68 IN | TEMPERATURE: 98.2 F | HEART RATE: 72 BPM | RESPIRATION RATE: 18 BRPM | OXYGEN SATURATION: 97 % | WEIGHT: 143.6 LBS | DIASTOLIC BLOOD PRESSURE: 80 MMHG

## 2018-02-09 LAB
ALBUMIN UR-MCNC: NEGATIVE MG/DL
APPEARANCE UR: CLEAR
BILIRUB UR QL STRIP: NEGATIVE
COLOR UR AUTO: NORMAL
ERYTHROCYTE [DISTWIDTH] IN BLOOD BY AUTOMATED COUNT: 11.7 % (ref 10–15)
GLUCOSE UR STRIP-MCNC: NEGATIVE MG/DL
HCT VFR BLD AUTO: 38.2 % (ref 40–53)
HGB BLD-MCNC: 13.3 G/DL (ref 13.3–17.7)
HGB UR QL STRIP: NEGATIVE
KETONES UR STRIP-MCNC: NEGATIVE MG/DL
LEUKOCYTE ESTERASE UR QL STRIP: NEGATIVE
MCH RBC QN AUTO: 31.2 PG (ref 26.5–33)
MCHC RBC AUTO-ENTMCNC: 34.8 G/DL (ref 31.5–36.5)
MCV RBC AUTO: 90 FL (ref 78–100)
NITRATE UR QL: NEGATIVE
PH UR STRIP: 7 PH (ref 5–7)
PLATELET # BLD AUTO: 151 10E9/L (ref 150–450)
RBC # BLD AUTO: 4.26 10E12/L (ref 4.4–5.9)
SOURCE: NORMAL
SP GR UR STRIP: 1 (ref 1–1.03)
UROBILINOGEN UR STRIP-MCNC: NORMAL MG/DL (ref 0–2)
WBC # BLD AUTO: 6.7 10E9/L (ref 4–11)

## 2018-02-09 PROCEDURE — 87086 URINE CULTURE/COLONY COUNT: CPT | Performed by: TRANSPLANT SURGERY

## 2018-02-09 PROCEDURE — 85027 COMPLETE CBC AUTOMATED: CPT | Performed by: TRANSPLANT SURGERY

## 2018-02-09 PROCEDURE — 81003 URINALYSIS AUTO W/O SCOPE: CPT | Performed by: TRANSPLANT SURGERY

## 2018-02-09 PROCEDURE — 25000132 ZZH RX MED GY IP 250 OP 250 PS 637: Performed by: TRANSPLANT SURGERY

## 2018-02-09 PROCEDURE — 25000125 ZZHC RX 250: Performed by: PHYSICIAN ASSISTANT

## 2018-02-09 PROCEDURE — 25000132 ZZH RX MED GY IP 250 OP 250 PS 637

## 2018-02-09 PROCEDURE — 36415 COLL VENOUS BLD VENIPUNCTURE: CPT | Performed by: TRANSPLANT SURGERY

## 2018-02-09 RX ORDER — AMOXICILLIN 250 MG
2 CAPSULE ORAL 2 TIMES DAILY
Qty: 50 TABLET | Refills: 1 | Status: SHIPPED | OUTPATIENT
Start: 2018-02-09 | End: 2018-06-25

## 2018-02-09 RX ORDER — ACETAMINOPHEN 325 MG/1
650 TABLET ORAL EVERY 4 HOURS PRN
Qty: 50 TABLET | Refills: 0 | Status: SHIPPED | OUTPATIENT
Start: 2018-02-09 | End: 2019-04-01

## 2018-02-09 RX ORDER — SCOLOPAMINE TRANSDERMAL SYSTEM 1 MG/1
1 PATCH, EXTENDED RELEASE TRANSDERMAL
Status: DISCONTINUED | OUTPATIENT
Start: 2018-02-09 | End: 2018-02-09 | Stop reason: HOSPADM

## 2018-02-09 RX ORDER — ONDANSETRON 4 MG/1
4 TABLET, ORALLY DISINTEGRATING ORAL EVERY 6 HOURS PRN
Qty: 20 TABLET | Refills: 1 | Status: SHIPPED | OUTPATIENT
Start: 2018-02-09 | End: 2018-06-25

## 2018-02-09 RX ORDER — POLYETHYLENE GLYCOL 3350 17 G/17G
17 POWDER, FOR SOLUTION ORAL 2 TIMES DAILY
Qty: 7 PACKET | Refills: 1 | Status: SHIPPED | OUTPATIENT
Start: 2018-02-09 | End: 2018-06-25

## 2018-02-09 RX ORDER — OXYCODONE HYDROCHLORIDE 5 MG/1
5 TABLET ORAL
Qty: 20 TABLET | Refills: 0 | Status: SHIPPED | OUTPATIENT
Start: 2018-02-09 | End: 2019-04-01

## 2018-02-09 RX ORDER — SCOLOPAMINE TRANSDERMAL SYSTEM 1 MG/1
1 PATCH, EXTENDED RELEASE TRANSDERMAL
Qty: 3 PATCH | Refills: 1 | Status: SHIPPED | OUTPATIENT
Start: 2018-02-12 | End: 2018-06-25

## 2018-02-09 RX ADMIN — ACETAMINOPHEN 650 MG: 325 TABLET, FILM COATED ORAL at 08:27

## 2018-02-09 RX ADMIN — POLYETHYLENE GLYCOL 3350 17 G: 17 POWDER, FOR SOLUTION ORAL at 13:12

## 2018-02-09 RX ADMIN — ACETAMINOPHEN 650 MG: 325 TABLET, FILM COATED ORAL at 13:55

## 2018-02-09 RX ADMIN — ACETAMINOPHEN 650 MG: 325 TABLET, FILM COATED ORAL at 02:17

## 2018-02-09 RX ADMIN — SCOPALAMINE 1 PATCH: 1 PATCH, EXTENDED RELEASE TRANSDERMAL at 13:11

## 2018-02-09 RX ADMIN — RANITIDINE 150 MG: 150 TABLET, FILM COATED ORAL at 08:27

## 2018-02-09 RX ADMIN — OXYCODONE HYDROCHLORIDE 10 MG: 5 TABLET ORAL at 00:22

## 2018-02-09 NOTE — PLAN OF CARE
Pt DC today at 1655. Pt was wheeled out by his wife. PIV removed with no issues. Education done with DC papers. Medications were picked up from pharmacy by his RN.

## 2018-02-09 NOTE — PLAN OF CARE
"Problem: Patient Care Overview  Goal: Plan of Care/Patient Progress Review  Outcome: No Change  /86 (BP Location: Left arm)  Pulse 107  Temp 98.6  F (37  C) (Oral)  Resp 18  Ht 1.727 m (5' 8\")  Wt 65.1 kg (143 lb 9.6 oz)  SpO2 95%  BMI 21.83 kg/m2    A/Ox4. VSS on RA. Regular diet; good appetite. Up independently. Gave a suppository and Miralax at beginning of shift. Pt had one very large formed stool. Scheduled senna given this evening, but pt refused second dose of Miralax - he is agreeable to taking it tomorrow morning though. Pt ambulated in bazan x3; wears abdominal binder. Incision in liquid bandaged midline and intact. R PIV SL. Urine output 600mL + one occurrence. Pain well controlled with oxycodone and tylenol. Denies nausea. Will continue with plan of care. Plan for discharge tomorrow morning.       "

## 2018-02-09 NOTE — PLAN OF CARE
"Problem: Patient Care Overview  Goal: Plan of Care/Patient Progress Review  Outcome: Improving  /83 (BP Location: Right arm)  Pulse 107  Temp 99.2  F (37.3  C) (Oral)  Resp 18  Ht 1.727 m (5' 8\")  Wt 65.1 kg (143 lb 9.6 oz)  SpO2 96%  BMI 21.83 kg/m2    A&Ox4. VSS on RA. Up independently. Oxycodone given x1 for c/o incisional pain and tylenol x1 for headache with relief. PIV saline locked. Adequate UOP. Pt calls appropriately and is able to make needs known. He appeared to rest comfortably between cares. Plan is for discharge this morning. Will continue to monitor and follow POC.       "

## 2018-02-09 NOTE — PLAN OF CARE
"Problem: Patient Care Overview  Goal: Plan of Care/Patient Progress Review  Outcome: No Change  Blood pressure 124/85, pulse 72, temperature 98.6  F (37  C), temperature source Oral, resp. rate 18, height 1.727 m (5' 8\"), weight 65.1 kg (143 lb 9.6 oz), SpO2 98 %.  pt. Still c/o feeling dizzy. Symptoms seem to come on when he first starts to eat. Discussed with ARNIE William   Checked orthostatic BP; results unremarkable. Pt. Denies pain and hasn't had Oxycodone since midnight. Denies nausea. Has poor po intake; eating very small amounts of food or eating saltines.  Able to walk safely in his room to the bathroom. Wife at bedside.   Plan: place Scopolamine patch. Encourage fluids. Asked our dietician to visit patient to find options for more intake.      "

## 2018-02-09 NOTE — DISCHARGE SUMMARY
[facility], [place]    Discharge Summary  Solid Organ Transplant Surgery    Date of Admission:  2/6/2018  Date of Discharge:  2/9/2018  Date of Service (when I saw the patient): 02/09/18    Discharge Diagnoses   Active Problems:    Encounter for donation of kidney    Procedure/Surgery Information   Procedure: Procedure(s):  Laparoscopic Hand Assisted Living Unrelated Kidney Donor, anesthesia Block - Wound Class: II-Clean Contaminated   Surgeon(s): Surgeon(s) and Role:     * Osbaldo Hooker MD - Primary     * Aga Donaldson MD - Assisting             History of Present Illness   Shantanu Martinez is a 27 year old male who presented for nephrectomy for kidney donation.    Hospital Course   Shantanu Martinez was admitted on 2/6/2018 and underwent nephrectomy for kidney donation. PO intake was adequate prior to discharge. Bowel function with bowel regimen and Dulcolax suppository.     Post-operative pain control: included Hydromorphone (dilaudid) IV, Toradol and Tylenol IV and will be Oxycodone and Tylenol on discharge. Will use minimal Oxycodone due to dizziness and nausea as a side effect. The patient was instructed to avoid NSAID medications.     Discharge Disposition   Discharged to home   Condition at discharge: Stable    Primary Care Physician   Michael Zelaya    Physical Exam   Temp: 98.6  F (37  C) Temp src: Oral BP: 124/85 Pulse: 72 Heart Rate: 72 Resp: 18 SpO2: 98 % O2 Device: None (Room air)    Vitals:    02/06/18 0651 02/07/18 0723 02/07/18 0900   Weight: 61.2 kg (134 lb 14.7 oz) 61.3 kg (135 lb 1.6 oz) 65.1 kg (143 lb 9.6 oz)     Vital Signs with Ranges  Temp:  [98.6  F (37  C)-99.7  F (37.6  C)] 98.6  F (37  C)  Pulse:  [72] 72  Heart Rate:  [72-91] 72  Resp:  [18] 18  BP: (124-126)/(83-85) 124/85  SpO2:  [96 %-98 %] 98 %  I/O last 3 completed shifts:  In: 650 [P.O.:650]  Out: 1550 [Urine:1550]    HEENT: sclera anicteric, mmm, conjunctiva pink   CV: NSR   Lungs: NLB on room  air  Abdomen: soft, slightly distended, and appropriately tender   Incision(s): C/D/I with no rehana-incisional eccymoses  Extremities: no cyanosis or edema     Consultations This Hospital Stay   PHARMACY IP CONSULT  MEDICATION HISTORY IP PHARMACY CONSULT    Time Spent on this Encounter   I have spent greater than 30 minutes on this discharge.    Discharge Orders     Reason for your hospital stay   Kidney donation.     Follow Up and recommended labs and tests   Follow up with Dr. Hooker in Transplant Clinic in 2-3 weeks.  If you need to change your appointment please contact your coordinator at [phone].     [facility] Follow-up and recommended labs and tests   Follow up with Dr. Hooker in Transplant Clinic in 2-3 weeks.     Activity   Your activity upon discharge: Don't lift anything heavier than 10 pounds for 6 weeks.   Walk regularly.    OK to drive only after no longer taking narcotics AND you feel comfortable working the breaks/clutch suddenly if needed.  Limit sports and strenuous activities/'core' exercises for 6 weeks.  If you experience pain after exertion, try an ice pack or warm pack to the area.   Wear abdominal binder for comfort if you desire, but only when out of bed.    You have been instructed to avoid NSAIDs (ibuprofen/aspirin like medications) as these medications may affect the remaining kidney. Take other medications as prescribed.     When to contact your care team   Your transplant coordinator if you have any of the following:   Swelling, oozing, worsening pain, unusual redness around the incision, or if:  Fever of 101.5 F or higher   Increasing abdominal pain   Nausea or vomiting   Severe diarrhea, bloating, or constipation     Any concerns or questions, please call your Transplant coordinator:    Phone: [phone].  If they are not available, the on call coordinator/MD will help you with your concern.     Wound care and dressings   Instructions to care for your wound at home:  OK to shower. Gently wash around your incisions with soap and water.   Don't bathe/submerge incisions until glue is off and any openings are closed.  Wear loose-fitting clothes. This will help you be more comfortable and cause less irritation around your incisions.     Full Code     Special Code (specify)     Diet   Follow this diet upon discharge: Keep yourself well hydrated (goal 1500 ml/day).   Eat lightly the first week as tolerated and avoid constipating foods.  If you are constipated, take a stool softener like Senna/Colace/Dulcolax/Miralax.       Discharge Medications   Current Discharge Medication List      CONTINUE these medications which have NOT CHANGED    Details   multivitamin, therapeutic with minerals (THERA-VIT-M) TABS Take 1 tablet by mouth daily           Allergies   Allergies   Allergen Reactions     Amoxicillin Rash     Demerol Rash     Data   Most Recent 3 CBC's:  Recent Labs   Lab Test  02/09/18   0841  02/07/18   0650  02/06/18   1648   08/03/17   0908  03/07/15   1214   WBC  6.7   --    --    --   4.0  6.5   HGB  13.3  13.6  14.7   < >  16.2  15.1   MCV  90   --    --    --   88  88   PLT  151   --    --    --   183  169    < > = values in this interval not displayed.      Most Recent 3 BMP's:  Recent Labs   Lab Test  02/07/18   0650  02/05/18   1109  08/03/17   0908  03/07/15   1205  11/24/13   2040   NA   --    --   142  139  139   POTASSIUM   --    --   4.0  4.1  3.9   CHLORIDE   --    --   105  106  105   CO2   --    --   28  28  30   BUN  21   --   16  15  20   CR  1.94*  1.04  1.10  1.02  1.30   ANIONGAP   --    --   8  5  4.1*   DASHA   --    --   9.1  8.8  8.5   GLC   --    --   93  81  89     Most Recent 2 LFT's:  Recent Labs   Lab Test  08/03/17   0908  11/24/13   2040   AST  25  21   ALT  28  32   ALKPHOS  54  73   BILITOTAL  1.3  0.8     Most Recent INR's and Anticoagulation Dosing History:  Anticoagulation Dose History     Recent Dosing and Labs Latest Ref Rng & Units 6/7/2013  8/3/2017    INR 0.86 - 1.14 1.11 1.10        Most Recent 3 Troponin's:No lab results found.  Most Recent Cholesterol Panel:  Recent Labs   Lab Test  08/03/17   0908   CHOL  127   LDL  55   HDL  64   TRIG  36     Most Recent 6 Bacteria Isolates From Any Culture (See EPIC Reports for Culture Details):  Recent Labs   Lab Test  02/09/18   1045  03/07/15   1111   CULT  PENDING  No beta hemolytic Streptococcus Group A isolated     Most Recent TSH, T4 and A1c Labs:  Recent Labs   Lab Test  08/03/17   0908   A1C  5.0     I have reviewed history, examined patient and discussed plan with the fellow/resident/HODAN.    I concur with the findings in this note.    Time spent on discharge activities: 45 minutes.

## 2018-02-09 NOTE — PROGRESS NOTES
"CLINICAL NUTRITION SERVICES - ASSESSMENT NOTE     Nutrition Prescription    RECOMMENDATIONS FOR MDs/PROVIDERS TO ORDER:  None at this time     Malnutrition Status:    Patient does not meet two of the above criteria necessary for diagnosing malnutrition but is at risk for malnutrition    Recommendations already ordered by Registered Dietitian (RD):  Nutritional supplements prn    Future/Additional Recommendations:  1. Oral intake, need for scheduled nutritional supplements      REASON FOR ASSESSMENT  Shantanu Martinez is a/an 27 year old male assessed by the dietitian for RN Consult - low po intake     NUTRITION HISTORY  Per patient appetite was good PTA. He now reports nausea, decreased intake. Patient states he was able to eat some ice cream and drink fluids after walking around a bit. He feels his nausea is improved after medication     CURRENT NUTRITION ORDERS  Diet: Regular  Intake/Tolerance: intake < 50% of TID since surgery    LABS  Labs reviewed    MEDICATIONS  Medications reviewed    ANTHROPOMETRICS  Height: 172.7 cm (5' 8\")  Most Recent Weight: 65.1 kg (143 lb 9.6 oz)    IBW: 69.2 kg  BMI: Normal BMI  Weight History:   Wt Readings from Last 10 Encounters:   02/07/18 65.1 kg (143 lb 9.6 oz)   02/05/18 62.1 kg (136 lb 14.4 oz)   08/03/17 63.5 kg (140 lb)   11/22/13 59 kg (130 lb)   09/06/13 59.9 kg (132 lb)   08/08/13 59 kg (130 lb)   08/06/13 59 kg (130 lb)   Dosing Weight: 65 kg (actual wt)    ASSESSED NUTRITION NEEDS  Estimated Energy Needs: 3157-0465 kcals/day (25 - 30 kcals/kg)  Justification: Maintenance  Estimated Protein Needs: 78-98 grams protein/day (1.2 - 1.5 grams of pro/kg)  Justification: Post-op  Estimated Fluid Needs: 1 mL/kcal   Justification: IMaintenance    PHYSICAL FINDINGS  See malnutrition section below.    MALNUTRITION  % Intake: Decreased intake does not meet criteria  % Weight Loss: None noted  Subcutaneous Fat Loss: None observed  Muscle Loss: Facial & jaw region:  mild and Thoracic " region (clavicle, acromium bone, deltoid, trapezius, pectoral): mild  Fluid Accumulation/Edema: None noted  Malnutrition Diagnosis: Patient does not meet two of the above criteria necessary for diagnosing malnutrition but is at risk for malnutrition    NUTRITION DIAGNOSIS  Inadequate oral intake related to decreased appetite, nausea as evidenced by recent PO intake < 50% of TID meals       INTERVENTIONS  Implementation  1. Nutrition Education: Provided education on increase kcals/protein in diet. Handouts provided Tips to increase protein in diet and Tips for increasing calories in diet. Pt verbalized understanding. Provided hospital supplement list   2. Medical food supplement therapy - prn    Goals  Patient to consume % of nutritionally adequate meal trays TID, or the equivalent with supplements/snacks.     Monitoring/Evaluation  Progress toward goals will be monitored and evaluated per protocol.    Natalya Eduardo MS/ZOE/JOHN/CNSC  [address] RD Pager: [phone]

## 2018-02-10 LAB
BACTERIA SPEC CULT: NO GROWTH
Lab: NORMAL
SPECIMEN SOURCE: NORMAL

## 2018-02-12 ENCOUNTER — TELEPHONE (OUTPATIENT)
Dept: TRANSPLANT | Facility: CLINIC | Age: 28
End: 2018-02-12

## 2018-02-14 NOTE — TELEPHONE ENCOUNTER
I called the patient to review how his recovery is following donation and discharge from the hospital.  He reports doing pretty well.  He said he has good pain control with tylenol and the narcotic he is only using at night.  He reports eating okay and drinking good amounts of fluids.  He reports having had bowel movements since surgery.  He said that his wound is dry and intact and no issues.  He described that they went home immediately after discharge due to their daughter.  He said the ride went okay.  I reviewed the upcoming appointment with his surgeon.  I answered his questions.

## 2018-02-26 ENCOUNTER — TELEPHONE (OUTPATIENT)
Dept: TRANSPLANT | Facility: CLINIC | Age: 28
End: 2018-02-26

## 2018-02-26 ENCOUNTER — OFFICE VISIT (OUTPATIENT)
Dept: TRANSPLANT | Facility: CLINIC | Age: 28
End: 2018-02-26
Attending: SURGERY

## 2018-02-26 VITALS
HEART RATE: 72 BPM | WEIGHT: 134.6 LBS | OXYGEN SATURATION: 99 % | SYSTOLIC BLOOD PRESSURE: 119 MMHG | HEIGHT: 68 IN | DIASTOLIC BLOOD PRESSURE: 75 MMHG | BODY MASS INDEX: 20.4 KG/M2

## 2018-02-26 DIAGNOSIS — Z52.4 KIDNEY DONOR: Primary | ICD-10-CM

## 2018-02-26 PROCEDURE — G0463 HOSPITAL OUTPT CLINIC VISIT: HCPCS | Mod: ZF

## 2018-02-26 ASSESSMENT — PAIN SCALES - GENERAL: PAINLEVEL: MILD PAIN (2)

## 2018-02-26 NOTE — MR AVS SNAPSHOT
"              After Visit Summary   2018    Shantanu Martinez    MRN: [id number]           Patient Information     Date Of Birth          1990        Visit Information        Provider Department      2018 2:45 PM Osbaldo Hooker MD [facility] Solid Organ Transplant        Today's Diagnoses     Kidney donor    -  1       Follow-ups after your visit        Who to contact     If you have questions or need follow up information about today's clinic visit or your schedule please contact [facility] TRANSPL[facility] directly at [phone].  Normal or non-critical lab and imaging results will be communicated to you by [employer], letter or phone within 4 business days after the clinic has received the results. If you do not hear from us within 7 days, please contact the clinic through [employer] or phone. If you have a critical or abnormal lab result, we will notify you by phone as soon as possible.  Submit refill requests through [employer] or call your pharmacy and they will forward the refill request to us. Please allow 3 business days for your refill to be completed.          Additional Information About Your Visit        MyChart Information     [employer] lets you send messages to your doctor, view your test results, renew your prescriptions, schedule appointments and more. To sign up, go to [email or web address] . Click on \"Log in\" on the left side of the screen, which will take you to the Welcome page. Then click on \"Sign up Now\" on the right side of the page.     You will be asked to enter the access code listed below, as well as some personal information. Please follow the directions to create your username and password.     Your access code is: [email or web address]  Expires: 2018  1:44 PM     Your access code will  in 90 days. If you need help or a new code, please call your [place] clinic or [phone].        Care EveryWhere ID     This is your Care EveryWhere ID. This could be used by other " "organizations to access your [place] medical records  [id number]        Your Vitals Were     Pulse Height Pulse Oximetry BMI (Body Mass Index)          72 1.727 m (5' 8\") 99% 20.47 kg/m2         Blood Pressure from Last 3 Encounters:   02/26/18 119/75   02/09/18 119/80   02/05/18 107/69    Weight from Last 3 Encounters:   02/26/18 61.1 kg (134 lb 9.6 oz)   02/07/18 65.1 kg (143 lb 9.6 oz)   02/05/18 62.1 kg (136 lb 14.4 oz)              Today, you had the following     No orders found for display       Primary Care Provider Office Phone # Fax #    Michael Zelaya [phone]       [facility] [address] [address]        Equal Access to Services     [facility]: Hadii aakash inman hadasho Sonilson, waaxda luqadaha, qaybta kaalmada adezakiyada, dee oscar . So [facility] [phone].    ATENCIÓN: Si habla español, tiene a biggs disposición servicios gratuitos de asistencia lingüística. Melly al [phone].    We comply with applicable federal civil rights laws and [place] laws. We do not discriminate on the basis of race, color, national origin, age, disability, sex, sexual orientation, or gender identity.            Thank you!     Thank you for choosing [facility] TRANSPL[facility]  for your care. Our goal is always to provide you with excellent care. Hearing back from our patients is one way we can continue to improve our services. Please take a few minutes to complete the written survey that you may receive in the mail after your visit with us. Thank you!             Your Updated Medication List - Protect others around you: Learn how to safely use, store and throw away your medicines at www.disposemymeds.org.          This list is accurate as of 2/26/18  4:57 PM.  Always use your most recent med list.                   Brand Name Dispense Instructions for use Diagnosis    acetaminophen 325 MG tablet    TYLENOL    50 tablet    Take 2 tablets (650 " mg) by mouth every 4 hours as needed for other (multimodal surgical pain management along with NSAIDS and opioid medication as indicated based on pain control and physical function.)    Encounter for donation of kidney       multivitamin, therapeutic with minerals Tabs tablet      Take 1 tablet by mouth daily        ondansetron 4 MG ODT tab    ZOFRAN-ODT    20 tablet    Take 1 tablet (4 mg) by mouth every 6 hours as needed for nausea or vomiting    Encounter for donation of kidney       oxyCODONE IR 5 MG tablet    ROXICODONE    20 tablet    Take 1 tablet (5 mg) by mouth every 3 hours as needed for other (pain control or improvement in physical function. Hold dose for analgesic side effects.)    Encounter for donation of kidney       polyethylene glycol Packet    MIRALAX/GLYCOLAX    7 packet    Take 17 g by mouth 2 times daily    Encounter for donation of kidney       scopolamine 72 hr patch    TRANSDERM    3 patch    Place 1 patch onto the skin every 72 hours    Encounter for donation of kidney       senna-docusate 8.6-50 MG per tablet    SENOKOT-S;PERICOLACE    50 tablet    Take 2 tablets by mouth 2 times daily    Encounter for donation of kidney

## 2018-02-26 NOTE — PROGRESS NOTES
Transplant Surgery Kidney Donor Progress Note     Medical record number: [id number]  YOB: 1990,   Date of Visit:  02/26/2018  For followup after kidney donation.    Assessment and Recommendations: Mr. Martinez is doing well after kidney donation. He reports some midline wound pain that is progressively improving. Also, he reports mild left flank pain, especially during the night.     1. Lifting restrictions: 10 lbs until 8 weeks post donation.  2. Followup: 4 weeks  3. Return to work to be determined per patient's progress, expected between 6-8 weeks after surgery.        Tariq Eagle,  Transplant Surgery Department,  Fellow,  [phone]    I have reviewed history, examined patient and discussed plan with the fellow/resident/HODAN.  I concur with the findings in this note.    Patient was counseled on complications of incision and short and long term care to minimize infection/hernia risk.    Total time: 15 min  Counseling time: 10 min                 ------------------------------------------------------------------------------------------    S: Mr. Martinez donate a kidney 3 weeks ago and is doing well, reporting no fevers, chills, dysuria. He is not taking narcotic analgesia.  @He@ is not constipated.  @He@ is mostly back to routine activities of daily living and is almost feeling ready to return to work at this time.    Medications:  Prescription Medications as of 2/26/2018             acetaminophen (TYLENOL) 325 MG tablet Take 2 tablets (650 mg) by mouth every 4 hours as needed for other (multimodal surgical pain management along with NSAIDS and opioid medication as indicated based on pain control and physical function.)    multivitamin, therapeutic with minerals (THERA-VIT-M) TABS Take 1 tablet by mouth daily    oxyCODONE IR (ROXICODONE) 5 MG tablet Take 1 tablet (5 mg) by mouth every 3 hours as needed for other (pain control or improvement in physical function. Hold dose for analgesic side effects.)     ondansetron (ZOFRAN-ODT) 4 MG ODT tab Take 1 tablet (4 mg) by mouth every 6 hours as needed for nausea or vomiting    scopolamine (TRANSDERM) 72 hr patch Place 1 patch onto the skin every 72 hours    polyethylene glycol (MIRALAX/GLYCOLAX) Packet Take 17 g by mouth 2 times daily    senna-docusate (SENOKOT-S;PERICOLACE) 8.6-50 MG per tablet Take 2 tablets by mouth 2 times daily          Exam:   [unfilled]    Labs:   Admission on 02/06/2018, Discharged on 02/09/2018   Component Date Value Ref Range Status     Glucose 02/06/2018 77  70 - 99 mg/dL Final     Hemoglobin 02/06/2018 14.7  13.3 - 17.7 g/dL Final     Hematocrit 02/06/2018 41.6  40.0 - 53.0 % Final     Hemoglobin 02/07/2018 13.6  13.3 - 17.7 g/dL Final     Hematocrit 02/07/2018 39.4* 40.0 - 53.0 % Final     Urea Nitrogen 02/07/2018 21  7 - 30 mg/dL Final     Creatinine 02/07/2018 1.94* 0.66 - 1.25 mg/dL Final     GFR Estimate 02/07/2018 42* >60 mL/min/1.7m2 Final    Non  GFR Calc     GFR Estimate If Black 02/07/2018 51* >60 mL/min/1.7m2 Final    African American GFR Calc     WBC 02/09/2018 6.7  4.0 - 11.0 10e9/L Final     RBC Count 02/09/2018 4.26* 4.4 - 5.9 10e12/L Final     Hemoglobin 02/09/2018 13.3  13.3 - 17.7 g/dL Final     Hematocrit 02/09/2018 38.2* 40.0 - 53.0 % Final     MCV 02/09/2018 90  78 - 100 fl Final     MCH 02/09/2018 31.2  26.5 - 33.0 pg Final     MCHC 02/09/2018 34.8  31.5 - 36.5 g/dL Final     RDW 02/09/2018 11.7  10.0 - 15.0 % Final     Platelet Count 02/09/2018 151  150 - 450 10e9/L Final     Specimen Description 02/09/2018 Midstream Urine   Final     Special Requests 02/09/2018 Specimen received in preservative   Final     Culture Micro 02/09/2018 No growth   Final     Color Urine 02/09/2018 Light Yellow   Final     Appearance Urine 02/09/2018 Clear   Final     Glucose Urine 02/09/2018 Negative  NEG^Negative mg/dL Final     Bilirubin Urine 02/09/2018 Negative  NEG^Negative Final     Ketones Urine 02/09/2018 Negative   NEG^Negative mg/dL Final     Specific Gravity Urine 02/09/2018 1.005  1.003 - 1.035 Final     Blood Urine 02/09/2018 Negative  NEG^Negative Final     pH Urine 02/09/2018 7.0  5.0 - 7.0 pH Final     Protein Albumin Urine 02/09/2018 Negative  NEG^Negative mg/dL Final     Urobilinogen mg/dL 02/09/2018 Normal  0.0 - 2.0 mg/dL Final     Nitrite Urine 02/09/2018 Negative  NEG^Negative Final     Leukocyte Esterase Urine 02/09/2018 Negative  NEG^Negative Final     Source 02/09/2018 Clean catch urine   Final

## 2018-02-26 NOTE — NURSING NOTE
"Chief Complaint   Patient presents with     RECHECK     2 week post donor kidney DOS 2/6/18.        Initial /75  Pulse 72  Ht 1.727 m (5' 8\")  Wt 61.1 kg (134 lb 9.6 oz)  SpO2 99%  BMI 20.47 kg/m2 Estimated body mass index is 20.47 kg/(m^2) as calculated from the following:    Height as of this encounter: 1.727 m (5' 8\").    Weight as of this encounter: 61.1 kg (134 lb 9.6 oz).  Medication Reconciliation: complete    "

## 2018-02-26 NOTE — LETTER
2/26/2018      RE: Shantanu Martinez  [address] [facility] [address]       Transplant Surgery Kidney Donor Progress Note     Medical record number: [id number]  YOB: 1990,   Date of Visit:  02/26/2018  For followup after kidney donation.    Assessment and Recommendations: Mr. Martinez is doing well after kidney donation. He reports some midline wound pain that is progressively improving. Also, he reports mild left flank pain, especially during the night.     1. Lifting restrictions: 10 lbs until 8 weeks post donation.  2. Followup: 4 weeks  3. Return to work to be determined per patient's progress, expected between 6-8 weeks after surgery.        Tariq Eagle,  Transplant Surgery Department,  Fellow,  [phone]    I have reviewed history, examined patient and discussed plan with the fellow/resident/HODAN.  I concur with the findings in this note.    Patient was counseled on complications of incision and short and long term care to minimize infection/hernia risk.    Total time: 15 min  Counseling time: 10 min                 ------------------------------------------------------------------------------------------    S: Mr. Martinez donate a kidney 3 weeks ago and is doing well, reporting no fevers, chills, dysuria. He is not taking narcotic analgesia.  @He@ is not constipated.  @He@ is mostly back to routine activities of daily living and is almost feeling ready to return to work at this time.    Medications:  Prescription Medications as of 2/26/2018             acetaminophen (TYLENOL) 325 MG tablet Take 2 tablets (650 mg) by mouth every 4 hours as needed for other (multimodal surgical pain management along with NSAIDS and opioid medication as indicated based on pain control and physical function.)    multivitamin, therapeutic with minerals (THERA-VIT-M) TABS Take 1 tablet by mouth daily    oxyCODONE IR (ROXICODONE) 5 MG tablet Take 1 tablet (5 mg) by mouth every 3 hours as needed for other (pain control or  improvement in physical function. Hold dose for analgesic side effects.)    ondansetron (ZOFRAN-ODT) 4 MG ODT tab Take 1 tablet (4 mg) by mouth every 6 hours as needed for nausea or vomiting    scopolamine (TRANSDERM) 72 hr patch Place 1 patch onto the skin every 72 hours    polyethylene glycol (MIRALAX/GLYCOLAX) Packet Take 17 g by mouth 2 times daily    senna-docusate (SENOKOT-S;PERICOLACE) 8.6-50 MG per tablet Take 2 tablets by mouth 2 times daily          Exam:   [unfilled]    Labs:   Admission on 02/06/2018, Discharged on 02/09/2018   Component Date Value Ref Range Status     Glucose 02/06/2018 77  70 - 99 mg/dL Final     Hemoglobin 02/06/2018 14.7  13.3 - 17.7 g/dL Final     Hematocrit 02/06/2018 41.6  40.0 - 53.0 % Final     Hemoglobin 02/07/2018 13.6  13.3 - 17.7 g/dL Final     Hematocrit 02/07/2018 39.4* 40.0 - 53.0 % Final     Urea Nitrogen 02/07/2018 21  7 - 30 mg/dL Final     Creatinine 02/07/2018 1.94* 0.66 - 1.25 mg/dL Final     GFR Estimate 02/07/2018 42* >60 mL/min/1.7m2 Final    Non  GFR Calc     GFR Estimate If Black 02/07/2018 51* >60 mL/min/1.7m2 Final    African American GFR Calc     WBC 02/09/2018 6.7  4.0 - 11.0 10e9/L Final     RBC Count 02/09/2018 4.26* 4.4 - 5.9 10e12/L Final     Hemoglobin 02/09/2018 13.3  13.3 - 17.7 g/dL Final     Hematocrit 02/09/2018 38.2* 40.0 - 53.0 % Final     MCV 02/09/2018 90  78 - 100 fl Final     MCH 02/09/2018 31.2  26.5 - 33.0 pg Final     MCHC 02/09/2018 34.8  31.5 - 36.5 g/dL Final     RDW 02/09/2018 11.7  10.0 - 15.0 % Final     Platelet Count 02/09/2018 151  150 - 450 10e9/L Final     Specimen Description 02/09/2018 Midstream Urine   Final     Special Requests 02/09/2018 Specimen received in preservative   Final     Culture Micro 02/09/2018 No growth   Final     Color Urine 02/09/2018 Light Yellow   Final     Appearance Urine 02/09/2018 Clear   Final     Glucose Urine 02/09/2018 Negative  NEG^Negative mg/dL Final     Bilirubin Urine  02/09/2018 Negative  NEG^Negative Final     Ketones Urine 02/09/2018 Negative  NEG^Negative mg/dL Final     Specific Gravity Urine 02/09/2018 1.005  1.003 - 1.035 Final     Blood Urine 02/09/2018 Negative  NEG^Negative Final     pH Urine 02/09/2018 7.0  5.0 - 7.0 pH Final     Protein Albumin Urine 02/09/2018 Negative  NEG^Negative mg/dL Final     Urobilinogen mg/dL 02/09/2018 Normal  0.0 - 2.0 mg/dL Final     Nitrite Urine 02/09/2018 Negative  NEG^Negative Final     Leukocyte Esterase Urine 02/09/2018 Negative  NEG^Negative Final     Source 02/09/2018 Clean catch urine   Final       Osbaldo Hooker MD

## 2018-02-26 NOTE — TELEPHONE ENCOUNTER
I left a voice mail message for Shantanu to ask how he is doing post surgery and to let him know that I did file a donor keith for him.    LEELEE Pardo, [facility]  Clinical  and Independent Donor Advocate  [facility]  Pager:  [phone]  Direct:  [phone]

## 2018-03-07 ENCOUNTER — TELEPHONE (OUTPATIENT)
Dept: TRANSPLANT | Facility: CLINIC | Age: 28
End: 2018-03-07

## 2018-03-07 NOTE — TELEPHONE ENCOUNTER
The patient called to report redness of his incision area and some clear yellowish red drainage.  He denies fever.  He said it is not overly warm to the touch.  He describes it as reddened.  He put a large bandaid on it last night and when he awoke it was saturated.  He then proceeded to send me a photo of the incision as well as a tissue that he had dabbed.  It looks slightly inflamed and one area that does not look to be healing very well. The drainage on the kleenix is yellow in color.  I recommended for him to have it seen by a clinic.  Since he lives 4 [place]/2 hours away, he will plan to go to a local clinic.  I have called them to let them know that the billing needs to come to us as well as alerted our billing department.

## 2018-03-09 ENCOUNTER — TELEPHONE (OUTPATIENT)
Dept: TRANSPLANT | Facility: CLINIC | Age: 28
End: 2018-03-09

## 2018-03-12 ENCOUNTER — OFFICE VISIT (OUTPATIENT)
Dept: TRANSPLANT | Facility: CLINIC | Age: 28
End: 2018-03-12
Attending: SURGERY
Payer: MEDICARE

## 2018-03-12 VITALS
HEIGHT: 68 IN | HEART RATE: 65 BPM | BODY MASS INDEX: 20.79 KG/M2 | WEIGHT: 137.2 LBS | DIASTOLIC BLOOD PRESSURE: 70 MMHG | OXYGEN SATURATION: 95 % | TEMPERATURE: 98.1 F | SYSTOLIC BLOOD PRESSURE: 105 MMHG

## 2018-03-12 DIAGNOSIS — Z52.4 KIDNEY DONOR: Primary | ICD-10-CM

## 2018-03-12 DIAGNOSIS — Z52.4 KIDNEY DONOR: ICD-10-CM

## 2018-03-12 LAB
GRAM STN SPEC: ABNORMAL
GRAM STN SPEC: ABNORMAL
Lab: ABNORMAL
SPECIMEN SOURCE: ABNORMAL

## 2018-03-12 PROCEDURE — 87075 CULTR BACTERIA EXCEPT BLOOD: CPT | Performed by: SURGERY

## 2018-03-12 PROCEDURE — G0463 HOSPITAL OUTPT CLINIC VISIT: HCPCS | Mod: ZF

## 2018-03-12 PROCEDURE — 87077 CULTURE AEROBIC IDENTIFY: CPT | Performed by: SURGERY

## 2018-03-12 PROCEDURE — 87205 SMEAR GRAM STAIN: CPT | Performed by: SURGERY

## 2018-03-12 PROCEDURE — 87102 FUNGUS ISOLATION CULTURE: CPT | Performed by: SURGERY

## 2018-03-12 PROCEDURE — 87186 SC STD MICRODIL/AGAR DIL: CPT | Performed by: SURGERY

## 2018-03-12 PROCEDURE — 87070 CULTURE OTHR SPECIMN AEROBIC: CPT | Performed by: SURGERY

## 2018-03-12 ASSESSMENT — PAIN SCALES - GENERAL: PAINLEVEL: NO PAIN (0)

## 2018-03-12 NOTE — NURSING NOTE
The patient came to clinic today for wound check.  Last Friday a local Nurse practitioner opened the wound 1.5 cm, following a CT.  He said he had a lot of pain prior to the opening of the wound.  Once they released the infected fluid out of the little pocket the pain went away.  He said that his diet has been fair, he would eat until the wound ached.    The wound today appears reddened edges.  Wound had small amount of blood tinged fluid.  He had nugauze packed in the wound and a large band aid covering the wound.  Today the surgeons examined it and culture was send of the wound fluid.    Wound was packed by the surgeon with 1/4 nugauze with iodine.  Covered by a bandage.  Supplies given to the patient with instruction to change dressing.  He will need to be seen by his Primary care team in two weeks and return to Transplant clinic to be seen in 4 to 6 weeks.    The patient will be due for his 6 week post labs next week, he has order and will go to the local lab.  I encouraged him to stay hydrated and increase his dietary intake.    He will be starting a new job next week.  I gave him a  parking pass for today's visit parking.

## 2018-03-12 NOTE — NURSING NOTE
"Chief Complaint   Patient presents with     RECHECK     Donor wound infection        Initial /70  Pulse 65  Temp 98.1  F (36.7  C)  Ht 1.727 m (5' 8\")  Wt 62.2 kg (137 lb 3.2 oz)  SpO2 95%  BMI 20.86 kg/m2 Estimated body mass index is 20.86 kg/(m^2) as calculated from the following:    Height as of this encounter: 1.727 m (5' 8\").    Weight as of this encounter: 62.2 kg (137 lb 3.2 oz).  Medication Reconciliation: complete    "

## 2018-03-12 NOTE — LETTER
3/12/2018      RE: Shantanu Martinez  [address] [facility] [address]       Transplant Surgery Kidney Donor Progress Note    Medical record number: [id number]  YOB: 1990,   Date of Visit:  03/12/2018  For followup after kidney donation.    Assessment and Recommendations: Mr. Martinez is doing fairly well after kidney donation.     1. Midline wound infection - needs regular packing and dressing change. Wound swab sent for microbiology.  2. Followup: commitment to healthy lifestyle and routine health exams as recommended for age and gender.   6 mo, 1 yr, and 2 yr followup per routine.         Kaitlynn Urbano MD  Fellow,  Division of Transplantation  [phone]    Patient was counseled on complications of incision and short and long term care to minimize infection/hernia risk.      Total time: 15 minutes  Counselling time: 10 minutes  .    ---------------------------------------------------------------------------------------------------    S: Mr. Martinez donate a kidney 5 weeks ago and is doing fairly well, reporting no fevers, chills, dysuria.  He is not taking narcotic analgesia.  He is back to partaking in routine activities of daily living. Post-donation concerns are: wound infection and delayed healing.    Medications:  Prescription Medications as of 3/12/2018             oxyCODONE IR (ROXICODONE) 5 MG tablet Take 1 tablet (5 mg) by mouth every 3 hours as needed for other (pain control or improvement in physical function. Hold dose for analgesic side effects.)    acetaminophen (TYLENOL) 325 MG tablet Take 2 tablets (650 mg) by mouth every 4 hours as needed for other (multimodal surgical pain management along with NSAIDS and opioid medication as indicated based on pain control and physical function.)    ondansetron (ZOFRAN-ODT) 4 MG ODT tab Take 1 tablet (4 mg) by mouth every 6 hours as needed for nausea or vomiting    scopolamine (TRANSDERM) 72 hr patch Place 1 patch onto the skin every 72 hours     polyethylene glycol (MIRALAX/GLYCOLAX) Packet Take 17 g by mouth 2 times daily    senna-docusate (SENOKOT-S;PERICOLACE) 8.6-50 MG per tablet Take 2 tablets by mouth 2 times daily    multivitamin, therapeutic with minerals (THERA-VIT-M) TABS Take 1 tablet by mouth daily          Exam:   Temp:  [98.1  F (36.7  C)] 98.1  F (36.7  C)  Pulse:  [65] 65  BP: (105)/(70) 105/70  SpO2:  [95 %] 95 %  General Appearance: in no apparent distress.   Skin: Normal, no rashes or jaundice  Heart: regular rate and rhythm, normal S1 and S2, no murmur  Lungs: easy respirations, no audible wheezing.  Abdomen: flat, The wound has 1 cm in length opening above umbilicus with minimal discharge , without hernia. The abdomen is non-tender. The   Extremities: no edema     Labs:   Recent Labs   Lab Test  02/07/18   0650   08/03/17   0908   BUN  21   --   16   CR  1.94*   < >  1.10   GFRESTIMATED  42*   < >  81   GLC   --    --   93    < > = values in this interval not displayed.     Recent Labs   Lab Test  02/09/18   1050  02/05/18   1116   COLOR  Light Yellow  Yellow   APPEARANCE  Clear  Cloudy   URINEGLC  Negative  Negative   URINEBILI  Negative  Negative   URINEKETONE  Negative  Negative   SG  1.005  1.017   UBLD  Negative  Negative   URINEPH  7.0  7.0   PROTEIN  Negative  Negative   NITRITE  Negative  Negative   LEUKEST  Negative  Negative   RBCU   --   0   WBCU   --   0     Recent Labs   Lab Test  08/03/17   0902   UTPG  0.05       Osbaldo Hooker MD

## 2018-03-13 ENCOUNTER — TELEPHONE (OUTPATIENT)
Dept: TRANSPLANT | Facility: CLINIC | Age: 28
End: 2018-03-13

## 2018-03-13 NOTE — TELEPHONE ENCOUNTER
I called the patient to review how he is doing today.  He reports feeling good today, he has been outside for a walk.  He said the dressing change went well today there was not as much fluid today.  He would like to have his return visit on March 26 due to the timing of starting his new job in April.  He needs a RTW letter by Wednesday to be sent to his email so he can bring it Thursday for the physical that he has for the new job.  He will be having labs as well the week of March 19 will be 6 weeks post donation.  He said the new job will have some lifting of tools and bending.  He said the real heavy items they get a lift to move them.    I have sent a task to the schedulers for the office visit with Dr Hooker on 3/26.

## 2018-03-14 LAB
BACTERIA SPEC CULT: ABNORMAL
Lab: ABNORMAL
SPECIMEN SOURCE: ABNORMAL

## 2018-03-19 ENCOUNTER — TELEPHONE (OUTPATIENT)
Dept: TRANSPLANT | Facility: CLINIC | Age: 28
End: 2018-03-19

## 2018-03-19 LAB
BACTERIA SPEC CULT: NORMAL
Lab: NORMAL
SPECIMEN SOURCE: NORMAL

## 2018-03-20 ENCOUNTER — TELEPHONE (OUTPATIENT)
Dept: TRANSPLANT | Facility: CLINIC | Age: 28
End: 2018-03-20

## 2018-03-20 NOTE — TELEPHONE ENCOUNTER
Shantanu returned my message.  He reports doing pretty well today.  He said the wound size remaining is about the size of the head of the q tip.  He said he had the work physical and they will want to have clearance once it is healed.  He will be coming Monday to see Dr Hooker. I will help him with RTW letter after that visit.

## 2018-03-26 ENCOUNTER — OFFICE VISIT (OUTPATIENT)
Dept: TRANSPLANT | Facility: CLINIC | Age: 28
End: 2018-03-26
Attending: SURGERY
Payer: MEDICARE

## 2018-03-26 VITALS
HEART RATE: 67 BPM | BODY MASS INDEX: 22.31 KG/M2 | WEIGHT: 138.8 LBS | DIASTOLIC BLOOD PRESSURE: 79 MMHG | HEIGHT: 66 IN | OXYGEN SATURATION: 99 % | SYSTOLIC BLOOD PRESSURE: 115 MMHG

## 2018-03-26 DIAGNOSIS — Z52.4 KIDNEY DONOR: Primary | ICD-10-CM

## 2018-03-26 PROCEDURE — G0463 HOSPITAL OUTPT CLINIC VISIT: HCPCS | Mod: ZF

## 2018-03-26 PROCEDURE — 87070 CULTURE OTHR SPECIMN AEROBIC: CPT | Performed by: STUDENT IN AN ORGANIZED HEALTH CARE EDUCATION/TRAINING PROGRAM

## 2018-03-26 PROCEDURE — 87077 CULTURE AEROBIC IDENTIFY: CPT | Performed by: STUDENT IN AN ORGANIZED HEALTH CARE EDUCATION/TRAINING PROGRAM

## 2018-03-26 PROCEDURE — 87186 SC STD MICRODIL/AGAR DIL: CPT | Performed by: STUDENT IN AN ORGANIZED HEALTH CARE EDUCATION/TRAINING PROGRAM

## 2018-03-26 ASSESSMENT — PAIN SCALES - GENERAL: PAINLEVEL: NO PAIN (0)

## 2018-03-26 NOTE — MR AVS SNAPSHOT
"              After Visit Summary   3/26/2018    Shantanu Martinez    MRN: [id number]           Patient Information     Date Of Birth          1990        Visit Information        Provider Department      3/26/2018 2:30 PM Osbaldo Hooker MD [facility] Solid Organ Transplant        Today's Diagnoses     Kidney donor    -  1       Follow-ups after your visit        Who to contact     If you have questions or need follow up information about today's clinic visit or your schedule please contact [facility] directly at [phone].  Normal or non-critical lab and imaging results will be communicated to you by [employer], letter or phone within 4 business days after the clinic has received the results. If you do not hear from us within 7 days, please contact the clinic through [employer] or phone. If you have a critical or abnormal lab result, we will notify you by phone as soon as possible.  Submit refill requests through [employer] or call your pharmacy and they will forward the refill request to us. Please allow 3 business days for your refill to be completed.          Additional Information About Your Visit        MyChart Information     [employer] lets you send messages to your doctor, view your test results, renew your prescriptions, schedule appointments and more. To sign up, go to [email or web address] . Click on \"Log in\" on the left side of the screen, which will take you to the Welcome page. Then click on \"Sign up Now\" on the right side of the page.     You will be asked to enter the access code listed below, as well as some personal information. Please follow the directions to create your username and password.     Your access code is: [email or web address]  Expires: 2018  2:44 PM     Your access code will  in 90 days. If you need help or a new code, please call your [place] clinic or [phone].        Care EveryWhere ID     This is your Care EveryWhere ID. This could be used by other " "organizations to access your [facility] medical records  [id number]        Your Vitals Were     Pulse Height Pulse Oximetry BMI (Body Mass Index)          67 1.676 m (5' 6\") 99% 22.4 kg/m2         Blood Pressure from Last 3 Encounters:   03/26/18 115/79   03/12/18 105/70   02/26/18 119/75    Weight from Last 3 Encounters:   03/26/18 63 kg (138 lb 12.8 oz)   03/12/18 62.2 kg (137 lb 3.2 oz)   02/26/18 61.1 kg (134 lb 9.6 oz)              We Performed the Following     Wound Culture Aerobic Bacterial        Primary Care Provider Office Phone # Fax #    Michael Zelaya [phone]       [facility] [address] [address]        Equal Access to Services     [facility] : Hadii aakash inman hadasho Sonilson, waaxda luqadaha, qaybta kaalmada adezakiyarea, dee oscar . So [facility] [phone].    ATENCIÓN: Si habla español, tiene a biggs disposición servicios gratuitos de asistencia lingüística. Melly al [phone].    We comply with applicable federal civil rights laws and [place] laws. We do not discriminate on the basis of race, color, national origin, age, disability, sex, sexual orientation, or gender identity.            Thank you!     Thank you for choosing [facility]  for your care. Our goal is always to provide you with excellent care. Hearing back from our patients is one way we can continue to improve our services. Please take a few minutes to complete the written survey that you may receive in the mail after your visit with us. Thank you!             Your Updated Medication List - Protect others around you: Learn how to safely use, store and throw away your medicines at www.disposemymeds.org.          This list is accurate as of 3/26/18  5:01 PM.  Always use your most recent med list.                   Brand Name Dispense Instructions for use Diagnosis    acetaminophen 325 MG tablet    TYLENOL    50 tablet    Take 2 tablets (650 " mg) by mouth every 4 hours as needed for other (multimodal surgical pain management along with NSAIDS and opioid medication as indicated based on pain control and physical function.)    Encounter for donation of kidney       multivitamin, therapeutic with minerals Tabs tablet      Take 1 tablet by mouth daily        ondansetron 4 MG ODT tab    ZOFRAN-ODT    20 tablet    Take 1 tablet (4 mg) by mouth every 6 hours as needed for nausea or vomiting    Encounter for donation of kidney       oxyCODONE IR 5 MG tablet    ROXICODONE    20 tablet    Take 1 tablet (5 mg) by mouth every 3 hours as needed for other (pain control or improvement in physical function. Hold dose for analgesic side effects.)    Encounter for donation of kidney       polyethylene glycol Packet    MIRALAX/GLYCOLAX    7 packet    Take 17 g by mouth 2 times daily    Encounter for donation of kidney       scopolamine 72 hr patch    TRANSDERM    3 patch    Place 1 patch onto the skin every 72 hours    Encounter for donation of kidney       senna-docusate 8.6-50 MG per tablet    SENOKOT-S;PERICOLACE    50 tablet    Take 2 tablets by mouth 2 times daily    Encounter for donation of kidney

## 2018-03-26 NOTE — LETTER
3/26/2018      RE: Shantanu Martinez  [address] [facility] [address]       Transplant Surgery Kidney Donor Progress Note    Medical record number: [id number]  YOB: 1990,   Date of Visit:  03/26/2018  For followup after kidney donation.    Assessment and Recommendations: Mr. Martinez is doing well after kidney donation.     1. Midline wound infection - still needs regular packing and dressing change but has improved significantly since last visit with healing via secondary intenWound swab sent for microbiology.  2. Followup: commitment to healthy lifestyle and routine health exams as recommended for age and gender. We'll see him back in clinic in 3 months time to ensure wound has healed completely      Kaitlynn Urbano MD  Fellow,  Division of Transplantation  [phone]    I have reviewed history, examined patient and discussed plan with the fellow/resident/HODAN.  I concur with the findings in this note.       Patient was counseled on complications of incision and short and long term care to minimize infection/hernia risk.      Total time: 15 minutes  Counselling time: 10 minutes    .    ---------------------------------------------------------------------------------------------------    S: Mr. Martinez donate a kidney 2/6/2018 ago and is doing well, reporting no fevers, chills, dysuria.  He is not taking narcotic analgesia.  He is back to partaking in routine activities of daily living. Post-donation concerns are: resolving wound infection.    Medications:  Prescription Medications as of 3/26/2018             oxyCODONE IR (ROXICODONE) 5 MG tablet Take 1 tablet (5 mg) by mouth every 3 hours as needed for other (pain control or improvement in physical function. Hold dose for analgesic side effects.)    acetaminophen (TYLENOL) 325 MG tablet Take 2 tablets (650 mg) by mouth every 4 hours as needed for other (multimodal surgical pain management along with NSAIDS and opioid medication as indicated based on pain control  and physical function.)    ondansetron (ZOFRAN-ODT) 4 MG ODT tab Take 1 tablet (4 mg) by mouth every 6 hours as needed for nausea or vomiting    scopolamine (TRANSDERM) 72 hr patch Place 1 patch onto the skin every 72 hours    polyethylene glycol (MIRALAX/GLYCOLAX) Packet Take 17 g by mouth 2 times daily    senna-docusate (SENOKOT-S;PERICOLACE) 8.6-50 MG per tablet Take 2 tablets by mouth 2 times daily    multivitamin, therapeutic with minerals (THERA-VIT-M) TABS Take 1 tablet by mouth daily          Exam:   General Appearance: in no apparent distress.   Skin: Normal, no rashes or jaundice  Heart: regular rate and rhythm, normal S1 and S2, no murmur  Lungs: easy respirations, no audible wheezing.  Abdomen: flat, The wound has 0.5 cm in length opening above umbilicus with minimal discharge, without hernia. The abdomen is non-tender.    Extremities: no edema     [unfilled]    Labs:   Recent Labs   Lab Test  02/07/18   0650   08/03/17   0908   BUN  21   --   16   CR  1.94*   < >  1.10   GFRESTIMATED  42*   < >  81   GLC   --    --   93    < > = values in this interval not displayed.     Recent Labs   Lab Test  02/09/18   1050  02/05/18   1116   COLOR  Light Yellow  Yellow   APPEARANCE  Clear  Cloudy   URINEGLC  Negative  Negative   URINEBILI  Negative  Negative   URINEKETONE  Negative  Negative   SG  1.005  1.017   UBLD  Negative  Negative   URINEPH  7.0  7.0   PROTEIN  Negative  Negative   NITRITE  Negative  Negative   LEUKEST  Negative  Negative   RBCU   --   0   WBCU   --   0     Recent Labs   Lab Test  08/03/17   0902   UTPG  0.05       Osbaldo Hooker MD

## 2018-03-26 NOTE — NURSING NOTE
"Chief Complaint   Patient presents with     RECHECK     post kidney donor, wund check        Initial /79  Pulse 67  Ht 1.676 m (5' 6\")  Wt 63 kg (138 lb 12.8 oz)  SpO2 99%  BMI 22.4 kg/m2 Estimated body mass index is 22.4 kg/(m^2) as calculated from the following:    Height as of this encounter: 1.676 m (5' 6\").    Weight as of this encounter: 63 kg (138 lb 12.8 oz).  Medication Reconciliation: complete    "

## 2018-03-26 NOTE — PROGRESS NOTES
Transplant Surgery Kidney Donor Progress Note    Medical record number: [id number]  YOB: 1990,   Date of Visit:  03/26/2018  For followup after kidney donation.    Assessment and Recommendations: Mr. Martinez is doing well after kidney donation.     1. Midline wound infection - still needs regular packing and dressing change but has improved significantly since last visit with healing via secondary intenWound swab sent for microbiology.  2. Followup: commitment to healthy lifestyle and routine health exams as recommended for age and gender. We'll see him back in clinic in 3 months time to ensure wound has healed completely      Kaitlynn Urbano MD  Fellow,  Division of Transplantation  [id number]    I have reviewed history, examined patient and discussed plan with the fellow/resident/HODAN.  I concur with the findings in this note.       Patient was counseled on complications of incision and short and long term care to minimize infection/hernia risk.      Total time: 15 minutes  Counselling time: 10 minutes    .    ---------------------------------------------------------------------------------------------------    S: Mr. Martinez donate a kidney 2/6/2018 ago and is doing well, reporting no fevers, chills, dysuria.  He is not taking narcotic analgesia.  He is back to partaking in routine activities of daily living. Post-donation concerns are: resolving wound infection.    Medications:  Prescription Medications as of 3/26/2018             oxyCODONE IR (ROXICODONE) 5 MG tablet Take 1 tablet (5 mg) by mouth every 3 hours as needed for other (pain control or improvement in physical function. Hold dose for analgesic side effects.)    acetaminophen (TYLENOL) 325 MG tablet Take 2 tablets (650 mg) by mouth every 4 hours as needed for other (multimodal surgical pain management along with NSAIDS and opioid medication as indicated based on pain control and physical function.)    ondansetron (ZOFRAN-ODT) 4 MG ODT tab Take 1  tablet (4 mg) by mouth every 6 hours as needed for nausea or vomiting    scopolamine (TRANSDERM) 72 hr patch Place 1 patch onto the skin every 72 hours    polyethylene glycol (MIRALAX/GLYCOLAX) Packet Take 17 g by mouth 2 times daily    senna-docusate (SENOKOT-S;PERICOLACE) 8.6-50 MG per tablet Take 2 tablets by mouth 2 times daily    multivitamin, therapeutic with minerals (THERA-VIT-M) TABS Take 1 tablet by mouth daily          Exam:   General Appearance: in no apparent distress.   Skin: Normal, no rashes or jaundice  Heart: regular rate and rhythm, normal S1 and S2, no murmur  Lungs: easy respirations, no audible wheezing.  Abdomen: flat, The wound has 0.5 cm in length opening above umbilicus with minimal discharge, without hernia. The abdomen is non-tender.    Extremities: no edema     [unfilled]    Labs:   Recent Labs   Lab Test  02/07/18   0650   08/03/17   0908   BUN  21   --   16   CR  1.94*   < >  1.10   GFRESTIMATED  42*   < >  81   GLC   --    --   93    < > = values in this interval not displayed.     Recent Labs   Lab Test  02/09/18   1050  02/05/18   1116   COLOR  Light Yellow  Yellow   APPEARANCE  Clear  Cloudy   URINEGLC  Negative  Negative   URINEBILI  Negative  Negative   URINEKETONE  Negative  Negative   SG  1.005  1.017   UBLD  Negative  Negative   URINEPH  7.0  7.0   PROTEIN  Negative  Negative   NITRITE  Negative  Negative   LEUKEST  Negative  Negative   RBCU   --   0   WBCU   --   0     Recent Labs   Lab Test  08/03/17   0902   [facility]  0.05

## 2018-03-28 LAB
BACTERIA SPEC CULT: ABNORMAL
Lab: ABNORMAL
SPECIMEN SOURCE: ABNORMAL

## 2018-03-30 ENCOUNTER — TELEPHONE (OUTPATIENT)
Dept: TRANSPLANT | Facility: CLINIC | Age: 28
End: 2018-03-30

## 2018-03-30 DIAGNOSIS — Z52.4 KIDNEY DONOR: Primary | ICD-10-CM

## 2018-03-30 NOTE — TELEPHONE ENCOUNTER
The patient reports doing well.  He said there is some bloody drainage in the wound today, but it is small amount.  He said he is using nuguaze to pack in the wound.  He said it has gotten smaller.  We reviewed recommendation of Dr Hooker for him to return to clinic for wound check.  Shantanu will let me know once he is aware of his new jobs work release restrictions, it will likely be in 3 months.  He needs another copy of the lab orders for the 6 week post labs sent to his home as he said he did not receive one yet.  He will take the order and go to local lab next week.

## 2018-04-09 LAB
FUNGUS SPEC CULT: NORMAL
Lab: NORMAL
SPECIMEN SOURCE: NORMAL

## 2018-04-24 ENCOUNTER — TELEPHONE (OUTPATIENT)
Dept: TRANSPLANT | Facility: CLINIC | Age: 28
End: 2018-04-24

## 2018-04-24 NOTE — TELEPHONE ENCOUNTER
Prescription called to [facility].  Patient was given instructions to take with food.  He will keep wound clean and as dry as possible.  He will wear his abdominal binder at work to protect it.   I will put in appointment for Monday early afternoon for him to see surgeon for wound check if he feels it is not improving.

## 2018-04-24 NOTE — TELEPHONE ENCOUNTER
The patient reports a blood blister/cyst on his incision wound.  It is bleeding and he has had to change the dressing 3 times today.  He sent photos which I have relayed to his surgeon and NP Debby Carver.  Dr Hooker think it is a suture granuloma.  Plan is to prescribe Keflex 500 Mg TID for 10 days.  Take with food.    I have a call into the patient and will call the prescription to his local pharmacy.  Dr Hooker can see him next Monday if the wound is not healing.

## 2018-04-26 ENCOUNTER — TELEPHONE (OUTPATIENT)
Dept: TRANSPLANT | Facility: CLINIC | Age: 28
End: 2018-04-26

## 2018-04-26 NOTE — TELEPHONE ENCOUNTER
The patient called to report that he has less pain today.  He started the Keflex on 4/25.  He said the amount and type of blood drainage is about the same as yesterday.  He stated that the little bump is still there on the one side of the wound.  The cyst like tissue has not changed its appearance.    We will put an appointment on Monday at 12:00 for him to see Debby Carver/Dr Hooker if the wound does not improve over the weekend.  We made a plan that he will either email me an update on Sunday or call Monday morning.

## 2018-05-11 ENCOUNTER — TELEPHONE (OUTPATIENT)
Dept: TRANSPLANT | Facility: CLINIC | Age: 28
End: 2018-05-11

## 2018-05-11 NOTE — TELEPHONE ENCOUNTER
Received message from donor coordinator, Cris, to reach out to Shantanu for weight loss s/p donation. Called pt and LVM.

## 2018-05-14 ENCOUNTER — TELEPHONE (OUTPATIENT)
Dept: TRANSPLANT | Facility: CLINIC | Age: 28
End: 2018-05-14

## 2018-05-14 NOTE — TELEPHONE ENCOUNTER
Talked with Shantanu, who is concerned about his 10 lb weight loss and very low energy since donation. His previous, stable UBW was 135-140 lbs, which he has been stable at x 6-7 years. Now he is down to 125 lbs and reports no noticeable changes in diet that would explain this. He reports his appetite was improving after surgery for a while, but now it is slowly fading. He was taking 30 grams in protein shakes/day (muscle milk protein powder mixed with 16 oz milk), then increased to 60 grams/day in shakes, but started having diarrhea so he stopped.     Yesterday's diet recall (more than typical)-  Eggs and badillo, coffee  2 sandwiches with lettuce and ham + salad  Salad, 2 polish sausages, hamburger, shrimp     He reports feeling full quicker than normal, but okay to force himself to eat more. He has concerns about lack of energy and being unable to play with his kids. He reports he has a high metabolism.     Emailed pt resources (and discussed these recommendations on the phone with him):  Chris Fournier-    I m sending you some handouts   - High calorie high protein meal and recipe ideas and how to increase calories/protein in your diet  - Protein supplement list. I usually give this to kidney transplant recipients but you may find some good ones on here (a column of this spreadsheet shows calories). At the bottom there are also some weight gainers you could look into.   - Protein shake recipes with calories listed. You could try another protein powder. [employer] has some smaller containers for a  sample  of protein powders so you could try those out and see if you can tolerate them before buying a larger container.   - Track current intake on my fitness pal obdulia. I would aim for 7740-4791 + calories per day and 70-85 grams protein per day. However, if you are currently getting this much (after you see how much you re eating from tracking), you likely will need more.   - Please email me sometime next week to tell me how  things are going. After you track your food and are consistently getting more calories/day (try to add 500 calories more/day than you currently are getting - for weight gain) and can t gain weight, please let me know. I will bring you to committee.     Lulú

## 2018-05-18 ENCOUNTER — TELEPHONE (OUTPATIENT)
Dept: TRANSPLANT | Facility: CLINIC | Age: 28
End: 2018-05-18

## 2018-05-18 NOTE — TELEPHONE ENCOUNTER
The patient called concerned that his labs were off.  He said the [facility] provider said his BUN was high.  I reviewed the labs that we got from April.  I do not have the ones that he is speaking of since we did not order them.  I reviewed that it is very important that he drink fluids throughout the day.  I stated that his baseline Cr level is 1.7 now and that is okay, but he may be dehydrated so should keep up with that.    He said his wound has healed for the most part.  He recently went in for exam locally when he was feeling more abdominal pain.  There was not any signs of hernia per the provider that examined him.  I reminded that we need to recheck labs at the 6 month point, which is July.

## 2018-05-25 ENCOUNTER — DOCUMENTATION ONLY (OUTPATIENT)
Dept: TRANSPLANT | Facility: CLINIC | Age: 28
End: 2018-05-25

## 2018-06-13 NOTE — PROGRESS NOTES
Sent pt email on 5/25. No response yet.    Chris Fournier-  Just checking in - how has your weight been since we talked last?    Lulú

## 2018-06-15 ENCOUNTER — TELEPHONE (OUTPATIENT)
Dept: TRANSPLANT | Facility: CLINIC | Age: 28
End: 2018-06-15

## 2018-06-15 NOTE — TELEPHONE ENCOUNTER
I got a call from the donor Shantanu Martinez.  He stated he has a new reoccurrence of an infection of his wound.    Sunday he went to ER.  it was a cyst like bubble.  The staff there opened an area up that was dime sized shaped.  (1cm x 1 cm).  The doctor thought it was just a seroma but then they got the culture back yesterday or today.  The culture was gram positive cocci and he is on an antibiotic doxycycline starting today.  He was seen this week by the Nurse practitioner Mendy Gallagher,in the [facility] who I spoke to today to get report.  They will send the records.  She said it looks good. She said she can see an internal stitch.  There is no induration.  The drainage is serosanguinous.  Shantanu has not gone to work this week to rest his wound.  They initially had him do a wet to dry but now that is not needed.  Now he is just keeping area clean and dry with a bandage.  Shantanu plans to return to work next week.  Shantanu says he has gained a little of the weight back.  I will report this information to Dr Hooker.    I told the patient most likely he will need to come down to our clinic for a wound check but I will call him back once I have reviewed with his surgeon.

## 2018-06-20 ENCOUNTER — TELEPHONE (OUTPATIENT)
Dept: TRANSPLANT | Facility: CLINIC | Age: 28
End: 2018-06-20

## 2018-06-20 NOTE — TELEPHONE ENCOUNTER
The patient stated he can come on Monday at 1:30 for wound check and procedure to clean with surgeon.  I have sent message to surgeon allowing to overbook clinic and plan for procedure.  I will then send to .   The patient states he is doing well, he said wound is pea sized.  Has a little tissue or stitch that he can see.

## 2018-06-21 ENCOUNTER — HOSPITAL ENCOUNTER (OUTPATIENT)
Facility: CLINIC | Age: 28
End: 2018-06-21
Attending: SURGERY | Admitting: SURGERY
Payer: COMMERCIAL

## 2018-06-21 ENCOUNTER — TELEPHONE (OUTPATIENT)
Dept: TRANSPLANT | Facility: CLINIC | Age: 28
End: 2018-06-21

## 2018-06-21 DIAGNOSIS — Z00.5 TRANSPLANT DONOR EVALUATION: Primary | ICD-10-CM

## 2018-06-21 NOTE — TELEPHONE ENCOUNTER
I called to review the plan for the clinic visit and wound procedure.  He is aware he has clinic appointment Monday June 25 at 1:30 with Dr Hooker.  He will plan to stay the night and will use the [facility]ending [facility] to [facility].  Donor  has been assisting him.    OR is scheduled for Tuesday June 26 at 0800 for wound abscess drainage.  The patient will consult with surgeon on Monday on whether length of stay in hospital is needed.  Shantanu is planning on taking the week off from work and has informed them of such. I offered to write letter for him if needed, he will let me know if they request anything.

## 2018-06-22 RX ORDER — CLINDAMYCIN PHOSPHATE 600 MG/50ML
600 INJECTION, SOLUTION INTRAVENOUS EVERY 8 HOURS
Status: CANCELLED | OUTPATIENT
Start: 2018-06-22

## 2018-06-25 ENCOUNTER — OFFICE VISIT (OUTPATIENT)
Dept: TRANSPLANT | Facility: CLINIC | Age: 28
End: 2018-06-25
Attending: SURGERY
Payer: MEDICARE

## 2018-06-25 ENCOUNTER — DOCUMENTATION ONLY (OUTPATIENT)
Dept: TRANSPLANT | Facility: CLINIC | Age: 28
End: 2018-06-25

## 2018-06-25 VITALS
RESPIRATION RATE: 18 BRPM | WEIGHT: 135.3 LBS | SYSTOLIC BLOOD PRESSURE: 117 MMHG | BODY MASS INDEX: 21.74 KG/M2 | HEART RATE: 66 BPM | HEIGHT: 66 IN | OXYGEN SATURATION: 96 % | DIASTOLIC BLOOD PRESSURE: 78 MMHG

## 2018-06-25 DIAGNOSIS — Z52.4 KIDNEY DONOR: Primary | ICD-10-CM

## 2018-06-25 PROCEDURE — G0463 HOSPITAL OUTPT CLINIC VISIT: HCPCS | Mod: ZF

## 2018-06-25 RX ORDER — CYANOCOBALAMIN 1000 UG/ML
1 INJECTION, SOLUTION INTRAMUSCULAR; SUBCUTANEOUS
COMMUNITY
End: 2018-06-25 | Stop reason: HOSPADM

## 2018-06-25 ASSESSMENT — PAIN SCALES - GENERAL: PAINLEVEL: NO PAIN (0)

## 2018-06-25 NOTE — PROGRESS NOTES
Transplant Surgery Kidney Donor Progress Note    Medical record number: [id number]  YOB: 1990,   Date of Visit:  06/25/2018  For followup after kidney donation.    Assessment and Recommendations: Mr. Martinez is doing well after kidney donation.   He had wound infection which was treated in his local hospital with drainage, removal of sutures and antibiotics. A scab has now formed over the wound and there is currently no discharge for the past 4 days.    1. Lifting restrictions: 10 lbs until 6-8 weeks post donation.  2. Followup: commitment to healthy lifestyle and routine health exams as recommended for age and gender.   6 mo, 1 yr, and 2 yr followup per routine.   3. To continue monitoring the wound for swelling, pain, redness of wound  4. No Plans for surgery  5. Return to work         Aga Donaldson  Fellow, Transplant surgery  Pager: [phone]    I have reviewed history, examined patient and discussed plan with the fellow/resident/HODAN.  I concur with the findings in this note.     Patient was counseled on complications of incision and short and long term care to minimize infection/hernia risk.        Total time: 15 minutes  Counselling time: 10 minutes  .    ---------------------------------------------------------------------------------------------------    S: Mr. Martinez donate a kidney 20 weeks ago and is doing well, reporting no fevers, chills, dysuria.  He is not taking narcotic analgesia.  He is back to partaking in routine activities of daily living. Post-donation concerns are: wound infection which was treated with drainage, removal of sutures and antibiotics. A scab has now formed over the wound and there is currently no discharge for the past 4 days.    Medications:  Prescription Medications as of 6/25/2018             acetaminophen (TYLENOL) 325 MG tablet Take 2 tablets (650 mg) by mouth every 4 hours as needed for other (multimodal surgical pain management along with NSAIDS and opioid  medication as indicated based on pain control and physical function.)    cyanocobalamin (VITAMIN B12) 1000 MCG/ML injection Inject 1 mL into the muscle every 30 days    multivitamin, therapeutic with minerals (THERA-VIT-M) TABS Take 1 tablet by mouth daily    oxyCODONE IR (ROXICODONE) 5 MG tablet Take 1 tablet (5 mg) by mouth every 3 hours as needed for other (pain control or improvement in physical function. Hold dose for analgesic side effects.)          Exam:   Appearance: in no apparent distress.   Skin: normal  Head and Neck: Normal, no rashes or jaundice  Respiratory: normal respiratory excursions, no audible wheeze  Cardiovascular: RRR  Abdomen: flat, No distention, Normal bowel sounds and Surgical scars consistent with history ; small scab in the lower end of the wound with no evidence of discharge  Extremeties: Edema, none  Neuro: without deficit       Labs:   Recent Labs   Lab Test  02/07/18   0650   08/03/17   0908   BUN  21   --   16   CR  1.94*   < >  1.10   GFRESTIMATED  42*   < >  81   GLC   --    --   93    < > = values in this interval not displayed.     Recent Labs   Lab Test  02/09/18   1050  02/05/18   1116   COLOR  Light Yellow  Yellow   APPEARANCE  Clear  Cloudy   URINEGLC  Negative  Negative   URINEBILI  Negative  Negative   URINEKETONE  Negative  Negative   SG  1.005  1.017   UBLD  Negative  Negative   URINEPH  7.0  7.0   PROTEIN  Negative  Negative   NITRITE  Negative  Negative   LEUKEST  Negative  Negative   RBCU   --   0   WBCU   --   0     Recent Labs   Lab Test  08/03/17   0902   [facility]PG  0.05

## 2018-06-25 NOTE — LETTER
6/25/2018      RE: Shantanu Martinez  [address] [address]       Transplant Surgery Kidney Donor Progress Note    Medical record number: [id number]  YOB: 1990,   Date of Visit:  06/25/2018  For followup after kidney donation.    Assessment and Recommendations: Mr. Martinez is doing well after kidney donation.   He had wound infection which was treated in his local hospital with drainage, removal of sutures and antibiotics. A scab has now formed over the wound and there is currently no discharge for the past 4 days.    1. Lifting restrictions: 10 lbs until 6-8 weeks post donation.  2. Followup: commitment to healthy lifestyle and routine health exams as recommended for age and gender.   6 mo, 1 yr, and 2 yr followup per routine.   3. To continue monitoring the wound for swelling, pain, redness of wound  4. No Plans for surgery  5. Return to work         Aga Donaldson  Fellow, Transplant surgery  Pager: [phone]    I have reviewed history, examined patient and discussed plan with the fellow/resident/HODAN.  I concur with the findings in this note.     Patient was counseled on complications of incision and short and long term care to minimize infection/hernia risk.        Total time: 15 minutes  Counselling time: 10 minutes  .    ---------------------------------------------------------------------------------------------------    S: Mr. Martinez donate a kidney 20 weeks ago and is doing well, reporting no fevers, chills, dysuria.  He is not taking narcotic analgesia.  He is back to partaking in routine activities of daily living. Post-donation concerns are: wound infection which was treated with drainage, removal of sutures and antibiotics. A scab has now formed over the wound and there is currently no discharge for the past 4 days.    Medications:  Prescription Medications as of 6/25/2018             acetaminophen (TYLENOL) 325 MG tablet Take 2 tablets (650 mg) by mouth every 4 hours as needed for  other (multimodal surgical pain management along with NSAIDS and opioid medication as indicated based on pain control and physical function.)    cyanocobalamin (VITAMIN B12) 1000 MCG/ML injection Inject 1 mL into the muscle every 30 days    multivitamin, therapeutic with minerals (THERA-VIT-M) TABS Take 1 tablet by mouth daily    oxyCODONE IR (ROXICODONE) 5 MG tablet Take 1 tablet (5 mg) by mouth every 3 hours as needed for other (pain control or improvement in physical function. Hold dose for analgesic side effects.)          Exam:   Appearance: in no apparent distress.   Skin: normal  Head and Neck: Normal, no rashes or jaundice  Respiratory: normal respiratory excursions, no audible wheeze  Cardiovascular: RRR  Abdomen: flat, No distention, Normal bowel sounds and Surgical scars consistent with history ; small scab in the lower end of the wound with no evidence of discharge  Extremeties: Edema, none  Neuro: without deficit       Labs:   Recent Labs   Lab Test  02/07/18   0650   08/03/17   0908   BUN  21   --   16   CR  1.94*   < >  1.10   GFRESTIMATED  42*   < >  81   GLC   --    --   93    < > = values in this interval not displayed.     Recent Labs   Lab Test  02/09/18   1050  02/05/18   1116   COLOR  Light Yellow  Yellow   APPEARANCE  Clear  Cloudy   URINEGLC  Negative  Negative   URINEBILI  Negative  Negative   URINEKETONE  Negative  Negative   SG  1.005  1.017   UBLD  Negative  Negative   URINEPH  7.0  7.0   PROTEIN  Negative  Negative   NITRITE  Negative  Negative   LEUKEST  Negative  Negative   RBCU   --   0   WBCU   --   0     Recent Labs   Lab Test  08/03/17   0902   [facility]PG  0.05       Osbaldo Hooker MD

## 2018-06-25 NOTE — PROGRESS NOTES
I visited with the patient today in clinic.  He is here to have wound checked by his surgical team.  I gave him parking pass for today.  He was able to get [facility] money for travel today and for tonight.  The surgeons assessed that surgical clean out procedure is not needed.  He should go home and monitor for any signs of wound not healing.  He is planning to take this week off of work to rest.    He reports that his appetite has had some improvement, he has gained 5 pounds.  He does have occasional fatigue.  I encouraged him to balance some activity with rest and to continue the diet that he has been on.  I encouraged him to let me know if he needs any forms filled out for his medical leave from work.

## 2018-06-25 NOTE — MR AVS SNAPSHOT
"              After Visit Summary   6/25/2018    Shantanu Martinez    MRN: [id number]           Patient Information     Date Of Birth          1990        Visit Information        Provider Department      6/25/2018 1:30 PM Osbaldo Hooker MD [facility] Solid Organ Transplant        Today's Diagnoses     Kidney donor    -  1       Follow-ups after your visit        Who to contact     If you have questions or need follow up information about today's clinic visit or your schedule please contact [facility] SOLID ORGAN TRANSPLANT directly at [phone].  Normal or non-critical lab and imaging results will be communicated to you by MyChart, letter or phone within 4 business days after the clinic has received the results. If you do not hear from us within 7 days, please contact the clinic through MyChart or phone. If you have a critical or abnormal lab result, we will notify you by phone as soon as possible.  Submit refill requests through [employer] or call your pharmacy and they will forward the refill request to us. Please allow 3 business days for your refill to be completed.          Additional Information About Your Visit        Care EveryWhere ID     This is your Care EveryWhere ID. This could be used by other organizations to access your [facility] medical records  [id number]        Your Vitals Were     Pulse Respirations Height Pulse Oximetry BMI (Body Mass Index)       66 18 1.676 m (5' 6\") 96% 21.84 kg/m2        Blood Pressure from Last 3 Encounters:   06/25/18 117/78   03/26/18 115/79   03/12/18 105/70    Weight from Last 3 Encounters:   06/25/18 61.4 kg (135 lb 4.8 oz)   03/26/18 63 kg (138 lb 12.8 oz)   03/12/18 62.2 kg (137 lb 3.2 oz)              Today, you had the following     No orders found for display       Primary Care Provider Office Phone # Fax #    Michael Zelaya [phone]       [facility] [address] [address]        Equal Access to Services     [facility] " GAAR : Hadii aad storm wilver Leblanc, waangieda luqadaha, qaybta kamatt connietram, waxheather ronda hayrolly cliftonpeggyaury oscar . So [facility] [phone].    ATENCIÓN: Si habla español, tiene a biggs disposición servicios gratuitos de asistencia lingüística. Llame al [phone].    We comply with applicable federal civil rights laws and [place] laws. We do not discriminate on the basis of race, color, national origin, age, disability, sex, sexual orientation, or gender identity.            Thank you!     Thank you for choosing [facility] SOLID ORGAN TRANSPLANT  for your care. Our goal is always to provide you with excellent care. Hearing back from our patients is one way we can continue to improve our services. Please take a few minutes to complete the written survey that you may receive in the mail after your visit with us. Thank you!             Your Updated Medication List - Protect others around you: Learn how to safely use, store and throw away your medicines at www.disposemymeds.org.          This list is accurate as of 6/25/18  5:44 PM.  Always use your most recent med list.                   Brand Name Dispense Instructions for use Diagnosis    acetaminophen 325 MG tablet    TYLENOL    50 tablet    Take 2 tablets (650 mg) by mouth every 4 hours as needed for other (multimodal surgical pain management along with NSAIDS and opioid medication as indicated based on pain control and physical function.)    Encounter for donation of kidney       multivitamin, therapeutic with minerals Tabs tablet      Take 1 tablet by mouth daily        oxyCODONE IR 5 MG tablet    ROXICODONE    20 tablet    Take 1 tablet (5 mg) by mouth every 3 hours as needed for other (pain control or improvement in physical function. Hold dose for analgesic side effects.)    Encounter for donation of kidney

## 2018-07-03 ENCOUNTER — TELEPHONE (OUTPATIENT)
Dept: TRANSPLANT | Facility: CLINIC | Age: 28
End: 2018-07-03

## 2018-07-03 NOTE — TELEPHONE ENCOUNTER
I sent a message checking on how his recovery is going.  I also asked if he needs assistance with a RTW letter.

## 2018-11-29 ENCOUNTER — TELEPHONE (OUTPATIENT)
Dept: TRANSPLANT | Facility: CLINIC | Age: 28
End: 2018-11-29

## 2018-11-29 NOTE — TELEPHONE ENCOUNTER
I received a call from the patient.  He is planning to go tomorrow for labs and he needs a copy of his orders.  I have emailed him the orders also I faxed [facility] the orders. He reports doing okay.  He says just about every day he has shooting pain in his abdomen.  He says sometimes he can just be sitting on the couch when it occurs.  We explored the idea of him coming in to be examened. He said it is a slow time at his work.  We planned for him to look at Mondays coming up that would work, since Dr Hooker was his surgeon.  Also I will watch for the lab results and we can discuss those and plan for surgeon visit to examen his wound.

## 2018-12-13 DIAGNOSIS — Z52.4 KIDNEY DONOR: Primary | ICD-10-CM

## 2018-12-14 ENCOUNTER — TELEPHONE (OUTPATIENT)
Dept: TRANSPLANT | Facility: CLINIC | Age: 28
End: 2018-12-14

## 2018-12-14 DIAGNOSIS — Z52.4 KIDNEY DONOR: Primary | ICD-10-CM

## 2018-12-14 NOTE — TELEPHONE ENCOUNTER
I reached Asdiq to review the plan for the appointments next week.  He needs to get clearance still from his boss to be out on Wednesday.He will call me Monday to confirm.  He will have keith money for the travel per Donor Social work team who reached out to [facility] keith office.  Kisha, CT then Dr Hooker visit on 12/19.

## 2018-12-19 ENCOUNTER — ANCILLARY PROCEDURE (OUTPATIENT)
Dept: CT IMAGING | Facility: CLINIC | Age: 28
End: 2018-12-19
Attending: SURGERY

## 2018-12-19 ENCOUNTER — OFFICE VISIT (OUTPATIENT)
Dept: TRANSPLANT | Facility: CLINIC | Age: 28
End: 2018-12-19
Attending: SURGERY

## 2018-12-19 VITALS
HEART RATE: 74 BPM | DIASTOLIC BLOOD PRESSURE: 77 MMHG | TEMPERATURE: 98.5 F | BODY MASS INDEX: 22.85 KG/M2 | OXYGEN SATURATION: 98 % | HEIGHT: 66 IN | WEIGHT: 142.2 LBS | SYSTOLIC BLOOD PRESSURE: 120 MMHG

## 2018-12-19 DIAGNOSIS — Z52.4 KIDNEY DONOR: ICD-10-CM

## 2018-12-19 DIAGNOSIS — Z52.4 KIDNEY DONOR: Primary | ICD-10-CM

## 2018-12-19 LAB
ALBUMIN SERPL-MCNC: 4 G/DL (ref 3.4–5)
ALBUMIN UR-MCNC: NEGATIVE MG/DL
ALP SERPL-CCNC: 53 U/L (ref 40–150)
ALT SERPL W P-5'-P-CCNC: 25 U/L (ref 0–70)
ANION GAP SERPL CALCULATED.3IONS-SCNC: 5 MMOL/L (ref 3–14)
APPEARANCE UR: CLEAR
AST SERPL W P-5'-P-CCNC: 16 U/L (ref 0–45)
BASOPHILS # BLD AUTO: 0 10E9/L (ref 0–0.2)
BASOPHILS NFR BLD AUTO: 0.2 %
BILIRUB SERPL-MCNC: 0.4 MG/DL (ref 0.2–1.3)
BILIRUB UR QL STRIP: NEGATIVE
BUN SERPL-MCNC: 24 MG/DL (ref 7–30)
CALCIUM SERPL-MCNC: 8.8 MG/DL (ref 8.5–10.1)
CHLORIDE SERPL-SCNC: 106 MMOL/L (ref 94–109)
CO2 SERPL-SCNC: 30 MMOL/L (ref 20–32)
COLOR UR AUTO: YELLOW
CREAT SERPL-MCNC: 1.45 MG/DL (ref 0.66–1.25)
CREAT UR-MCNC: 101 MG/DL
DIFFERENTIAL METHOD BLD: NORMAL
EOSINOPHIL # BLD AUTO: 0.1 10E9/L (ref 0–0.7)
EOSINOPHIL NFR BLD AUTO: 1.5 %
ERYTHROCYTE [DISTWIDTH] IN BLOOD BY AUTOMATED COUNT: 11.1 % (ref 10–15)
GFR SERPL CREATININE-BSD FRML MDRD: 65 ML/MIN/{1.73_M2}
GLUCOSE SERPL-MCNC: 78 MG/DL (ref 70–99)
GLUCOSE UR STRIP-MCNC: NEGATIVE MG/DL
HCT VFR BLD AUTO: 42.7 % (ref 40–53)
HGB BLD-MCNC: 14.7 G/DL (ref 13.3–17.7)
HGB UR QL STRIP: NEGATIVE
IMM GRANULOCYTES # BLD: 0 10E9/L (ref 0–0.4)
IMM GRANULOCYTES NFR BLD: 0.2 %
KETONES UR STRIP-MCNC: NEGATIVE MG/DL
LEUKOCYTE ESTERASE UR QL STRIP: NEGATIVE
LYMPHOCYTES # BLD AUTO: 1.3 10E9/L (ref 0.8–5.3)
LYMPHOCYTES NFR BLD AUTO: 21.9 %
MCH RBC QN AUTO: 30.7 PG (ref 26.5–33)
MCHC RBC AUTO-ENTMCNC: 34.4 G/DL (ref 31.5–36.5)
MCV RBC AUTO: 89 FL (ref 78–100)
MICROALBUMIN UR-MCNC: <5 MG/L
MICROALBUMIN/CREAT UR: NORMAL MG/G CR (ref 0–17)
MONOCYTES # BLD AUTO: 0.5 10E9/L (ref 0–1.3)
MONOCYTES NFR BLD AUTO: 8.3 %
NEUTROPHILS # BLD AUTO: 4.1 10E9/L (ref 1.6–8.3)
NEUTROPHILS NFR BLD AUTO: 67.9 %
NITRATE UR QL: NEGATIVE
NRBC # BLD AUTO: 0 10*3/UL
NRBC BLD AUTO-RTO: 0 /100
PH UR STRIP: 6 PH (ref 5–7)
PLATELET # BLD AUTO: 166 10E9/L (ref 150–450)
POTASSIUM SERPL-SCNC: 4.1 MMOL/L (ref 3.4–5.3)
PROT SERPL-MCNC: 7.5 G/DL (ref 6.8–8.8)
PROT UR-MCNC: 0.1 G/L
PROT/CREAT 24H UR: 0.1 G/G CR (ref 0–0.2)
RBC # BLD AUTO: 4.79 10E12/L (ref 4.4–5.9)
RBC #/AREA URNS AUTO: <1 /HPF (ref 0–2)
SODIUM SERPL-SCNC: 140 MMOL/L (ref 133–144)
SOURCE: ABNORMAL
SP GR UR STRIP: 1.01 (ref 1–1.03)
SPERM #/AREA URNS HPF: PRESENT /HPF
UROBILINOGEN UR STRIP-MCNC: 0 MG/DL (ref 0–2)
WBC # BLD AUTO: 6 10E9/L (ref 4–11)
WBC #/AREA URNS AUTO: 1 /HPF (ref 0–5)

## 2018-12-19 PROCEDURE — 81001 URINALYSIS AUTO W/SCOPE: CPT | Performed by: SURGERY

## 2018-12-19 PROCEDURE — 82043 UR ALBUMIN QUANTITATIVE: CPT | Performed by: SURGERY

## 2018-12-19 PROCEDURE — 36415 COLL VENOUS BLD VENIPUNCTURE: CPT | Performed by: SURGERY

## 2018-12-19 PROCEDURE — 84156 ASSAY OF PROTEIN URINE: CPT | Performed by: SURGERY

## 2018-12-19 PROCEDURE — 85025 COMPLETE CBC W/AUTO DIFF WBC: CPT | Performed by: SURGERY

## 2018-12-19 PROCEDURE — 80053 COMPREHEN METABOLIC PANEL: CPT | Performed by: SURGERY

## 2018-12-19 PROCEDURE — G0463 HOSPITAL OUTPT CLINIC VISIT: HCPCS | Mod: ZF

## 2018-12-19 ASSESSMENT — MIFFLIN-ST. JEOR: SCORE: 1562.76

## 2018-12-19 ASSESSMENT — PAIN SCALES - GENERAL: PAINLEVEL: MODERATE PAIN (4)

## 2018-12-19 NOTE — NURSING NOTE
"Chief Complaint   Patient presents with     RECHECK     abdomen pain 316 post donor tx      Blood pressure 120/77, pulse 74, temperature 98.5  F (36.9  C), temperature source Oral, height 1.676 m (5' 6\"), weight 64.5 kg (142 lb 3.2 oz), SpO2 98 %.    VICKY GROSSMAN CMA    "

## 2018-12-19 NOTE — PROGRESS NOTES
"D:  Mr. Shantanu Martinez is a 28 year old, , white, male who donated a kidney to a friend last February (POD #316).  He is still having pain off and on and had a series of wound infections that have now been resolved.  I:  His RN coordinator, RUSLAN Jones, RN, asked me to see him to evaluate his mood today.  A:  Mr. Martinez is pleasant and forthcoming.  He reports that his mood has been \"up and down\".  He lifts a lot for his work and it is a struggle having these pain issues.  He is also stressed by his daughter's serious health condition that is being treated now at [facility] in [place], [place].  He reports that his wife has noticed a change in his mood too.  He states that he feels good some days, and down other days.  He reports that he sleeps between 5 and 6 hours per night, which is typical for him, and that he has noticed some decreased appetite.  He denies any suicidal thinking or plans.  He reports that he lopez by spending time with friends and family and tries to maintain a \"take life as it comes\" kind of attitude.  Mr. Martinez is open to seeing his primary care physician in [place], [place], where he lives, to talk about starting an antidepressant.  I also recommended seeing a counselor to help support him and to learn CBT skills to help cope with his depression.  P:  Mr. Martinez will follow up with his PCP in [place], [place] to evaluate him for antidepressant medication.  I will provide referrals for psychotherapy in the [facility] system in [place], [place], since this is where he works and can see someone in town after work.    Cheryl Moss, LEELEE, [facility]  Clinical  and Independent Donor Advocate  [facility]  Pager:  [phone]  Direct:  [phone]      "

## 2018-12-19 NOTE — LETTER
"12/19/2018      RE: Shantanu Martinez  [address] [address]       D:  Mr. Shantanu Martinez is a 28 year old, , white, male who donated a kidney to a friend last February (POD #316).  He is still having pain off and on and had a series of wound infections that have now been resolved.  I:  His RN coordinator, RUSLAN Jones, RN, asked me to see him to evaluate his mood today.  A:  Mr. Martinez is pleasant and forthcoming.  He reports that his mood has been \"up and down\".  He lifts a lot for his work and it is a struggle having these pain issues.  He is also stressed by his daughter's serious health condition that is being treated now at [facility] in [place], [place].  He reports that his wife has noticed a change in his mood too.  He states that he feels good some days, and down other days.  He reports that he sleeps between 5 and 6 hours per night, which is typical for him, and that he has noticed some decreased appetite.  He denies any suicidal thinking or plans.  He reports that he lopez by spending time with friends and family and tries to maintain a \"take life as it comes\" kind of attitude.  Mr. Martinez is open to seeing his primary care physician in [place], [place], where he lives, to talk about starting an antidepressant.  I also recommended seeing a counselor to help support him and to learn CBT skills to help cope with his depression.  P:  Mr. Martinez will follow up with his PCP in [place], [place] to evaluate him for antidepressant medication.  I will provide referrals for psychotherapy in the [facility] system in [place], [place], since this is where he works and can see someone in town after work.    LEELEE Pardo, [facility]  Clinical  and Independent Donor Advocate  [facility]  Pager:  [phone]  Direct:  [phone]        Transplant Surgery Kidney Donor Progress Note    Medical record number: [id number]  YOB: 1990,   Date of " Visit:  12/21/2018  For followup after kidney donation.    Assessment and Recommendations: Mr. Martinez is doing well with occasional abdominal pain after kidney donation.     He donated 10 months ago, had an open wound which has now healed. Reports vague pain off an on in the abd, no GI sx    CT shows no hernia or other pathology as does physical exam.     Pain probably related to adhesions or neuralgia. Patient says that it is self limiting and he would like to not try a nerve block since it may not help due to the deep nature of thepain (intraabdominal).     Patient was counseled on complications of incision and short and long term care to minimize infection/hernia risk.    Total time: 25 minutes  Counselling time: 15 minutes    .    ---------------------------------------------------------------------------------------------------  Medications:  Prescription Medications as of 12/21/2018       Rx Number Disp Refills Start End Last Dispensed Date Next Fill Date Owning Pharmacy    acetaminophen (TYLENOL) 325 MG tablet  50 tablet 0 2/9/2018    [address]    Sig: Take 2 tablets (650 mg) by mouth every 4 hours as needed for other (multimodal surgical pain management along with NSAIDS and opioid medication as indicated based on pain control and physical function.)    Class: Local Print    Route: Oral    multivitamin, therapeutic with minerals (THERA-VIT-M) TABS            Sig: Take 1 tablet by mouth daily    Class: Historical    Route: Oral    oxyCODONE IR (ROXICODONE) 5 MG tablet  20 tablet 0 2/9/2018    [address]    Sig: Take 1 tablet (5 mg) by mouth every 3 hours as needed for other (pain control or improvement in physical function. Hold dose for analgesic side effects.)    Class: Local Print    Earliest Fill Date: 2/9/2018    Route: Oral          Exam:      Appearance: in no apparent distress.    Skin: normal  Head and Neck: Normal, no rashes or jaundice  Respiratory: normal respiratory excursions, no audible wheeze  Cardiovascular: RRR  Abdomen: flat, Surgical scars consistent with history, no hernia or cellulitis, non tender abd  Extremeties: Edema, none  Neuro: without deficit       Labs:   Recent Labs   Lab Test 12/19/18  0652   BUN 24   CR 1.45*   GFRESTIMATED 65   GLC 78     Recent Labs   Lab Test 12/19/18  0700   COLOR Yellow   APPEARANCE Clear   URINEGLC Negative   URINEBILI Negative   URINEKETONE Negative   SG 1.015   UBLD Negative   URINEPH 6.0   PROTEIN Negative   NITRITE Negative   LEUKEST Negative   RBCU <1   WBCU 1     Recent Labs   Lab Test 12/19/18  0700 08/03/17  0902   [facility] 0.10 0.05       Osbalod Hooker MD

## 2018-12-21 NOTE — PROGRESS NOTES
Transplant Surgery Kidney Donor Progress Note    Medical record number: [id number]  YOB: 1990,   Date of Visit:  12/21/2018  For followup after kidney donation.    Assessment and Recommendations: Mr. Martinez is doing well with occasional abdominal pain after kidney donation.     He donated 10 months ago, had an open wound which has now healed. Reports vague pain off an on in the abd, no GI sx    CT shows no hernia or other pathology as does physical exam.     Pain probably related to adhesions or neuralgia. Patient says that it is self limiting and he would like to not try a nerve block since it may not help due to the deep nature of thepain (intraabdominal).     Patient was counseled on complications of incision and short and long term care to minimize infection/hernia risk.    Total time: 25 minutes  Counselling time: 15 minutes    .    ---------------------------------------------------------------------------------------------------        Medications:  Prescription Medications as of 12/21/2018       Rx Number Disp Refills Start End Last Dispensed Date Next Fill Date Owning Pharmacy    acetaminophen (TYLENOL) 325 MG tablet  50 tablet 0 2/9/2018    [facility] - [address]    Sig: Take 2 tablets (650 mg) by mouth every 4 hours as needed for other (multimodal surgical pain management along with NSAIDS and opioid medication as indicated based on pain control and physical function.)    Class: Local Print    Route: Oral    multivitamin, therapeutic with minerals (THERA-VIT-M) TABS            Sig: Take 1 tablet by mouth daily    Class: Historical    Route: Oral    oxyCODONE IR (ROXICODONE) 5 MG tablet  20 tablet 0 2/9/2018    [facility] - [address]    Sig: Take 1 tablet (5 mg) by mouth every 3 hours as needed for other (pain control or improvement in physical function. Hold dose for analgesic side effects.)     Class: Local Print    Earliest Fill Date: 2/9/2018    Route: Oral          Exam:      Appearance: in no apparent distress.   Skin: normal  Head and Neck: Normal, no rashes or jaundice  Respiratory: normal respiratory excursions, no audible wheeze  Cardiovascular: RRR  Abdomen: flat, Surgical scars consistent with history, no hernia or cellulitis, non tender abd  Extremeties: Edema, none  Neuro: without deficit       Labs:   Recent Labs   Lab Test 12/19/18  0652   BUN 24   CR 1.45*   GFRESTIMATED 65   GLC 78     Recent Labs   Lab Test 12/19/18  0700   COLOR Yellow   APPEARANCE Clear   URINEGLC Negative   URINEBILI Negative   URINEKETONE Negative   SG 1.015   UBLD Negative   URINEPH 6.0   PROTEIN Negative   NITRITE Negative   LEUKEST Negative   RBCU <1   WBCU 1     Recent Labs   Lab Test 12/19/18  0700 08/03/17  0902   [facility] 0.10 0.05

## 2019-03-13 ENCOUNTER — TRANSFERRED RECORDS (OUTPATIENT)
Dept: HEALTH INFORMATION MANAGEMENT | Facility: CLINIC | Age: 29
End: 2019-03-13

## 2019-04-01 ENCOUNTER — HOSPITAL ENCOUNTER (EMERGENCY)
Facility: HOSPITAL | Age: 29
Discharge: HOME | End: 2019-04-01
Attending: INTERNAL MEDICINE | Admitting: INTERNAL MEDICINE
Payer: COMMERCIAL

## 2019-04-01 VITALS
DIASTOLIC BLOOD PRESSURE: 77 MMHG | WEIGHT: 140 LBS | RESPIRATION RATE: 16 BRPM | TEMPERATURE: 97.2 F | BODY MASS INDEX: 22.5 KG/M2 | HEART RATE: 59 BPM | OXYGEN SATURATION: 98 % | SYSTOLIC BLOOD PRESSURE: 107 MMHG | HEIGHT: 66 IN

## 2019-04-01 DIAGNOSIS — A08.4 VIRAL GASTROENTERITIS: ICD-10-CM

## 2019-04-01 LAB
ALBUMIN SERPL-MCNC: 4.3 G/DL (ref 3.4–5)
ALP SERPL-CCNC: 55 U/L (ref 40–150)
ALT SERPL W P-5'-P-CCNC: 40 U/L (ref 0–70)
ANION GAP SERPL CALCULATED.3IONS-SCNC: 7 MMOL/L (ref 3–14)
AST SERPL W P-5'-P-CCNC: 32 U/L (ref 0–45)
BASOPHILS # BLD AUTO: 0 10E9/L (ref 0–0.2)
BASOPHILS NFR BLD AUTO: 0.6 %
BILIRUB SERPL-MCNC: 1 MG/DL (ref 0.2–1.3)
BUN SERPL-MCNC: 26 MG/DL (ref 7–30)
CALCIUM SERPL-MCNC: 8.9 MG/DL (ref 8.5–10.1)
CHLORIDE SERPL-SCNC: 106 MMOL/L (ref 94–109)
CO2 SERPL-SCNC: 26 MMOL/L (ref 20–32)
CREAT SERPL-MCNC: 1.48 MG/DL (ref 0.66–1.25)
DIFFERENTIAL METHOD BLD: NORMAL
EOSINOPHIL # BLD AUTO: 0.1 10E9/L (ref 0–0.7)
EOSINOPHIL NFR BLD AUTO: 2.3 %
ERYTHROCYTE [DISTWIDTH] IN BLOOD BY AUTOMATED COUNT: 11.6 % (ref 10–15)
GFR SERPL CREATININE-BSD FRML MDRD: 64 ML/MIN/{1.73_M2}
GLUCOSE SERPL-MCNC: 94 MG/DL (ref 70–99)
HCT VFR BLD AUTO: 40 % (ref 40–53)
HGB BLD-MCNC: 14.5 G/DL (ref 13.3–17.7)
IMM GRANULOCYTES # BLD: 0 10E9/L (ref 0–0.4)
IMM GRANULOCYTES NFR BLD: 0 %
LYMPHOCYTES # BLD AUTO: 1.3 10E9/L (ref 0.8–5.3)
LYMPHOCYTES NFR BLD AUTO: 28 %
MCH RBC QN AUTO: 31.7 PG (ref 26.5–33)
MCHC RBC AUTO-ENTMCNC: 36.3 G/DL (ref 31.5–36.5)
MCV RBC AUTO: 88 FL (ref 78–100)
MONOCYTES # BLD AUTO: 0.5 10E9/L (ref 0–1.3)
MONOCYTES NFR BLD AUTO: 10.4 %
NEUTROPHILS # BLD AUTO: 2.8 10E9/L (ref 1.6–8.3)
NEUTROPHILS NFR BLD AUTO: 58.7 %
NRBC # BLD AUTO: 0 10*3/UL
NRBC BLD AUTO-RTO: 0 /100
PLATELET # BLD AUTO: 170 10E9/L (ref 150–450)
POTASSIUM SERPL-SCNC: 4.1 MMOL/L (ref 3.4–5.3)
PROT SERPL-MCNC: 7.2 G/DL (ref 6.8–8.8)
RBC # BLD AUTO: 4.57 10E12/L (ref 4.4–5.9)
SODIUM SERPL-SCNC: 139 MMOL/L (ref 133–144)
WBC # BLD AUTO: 4.8 10E9/L (ref 4–11)

## 2019-04-01 PROCEDURE — 80053 COMPREHEN METABOLIC PANEL: CPT | Performed by: INTERNAL MEDICINE

## 2019-04-01 PROCEDURE — 85025 COMPLETE CBC W/AUTO DIFF WBC: CPT | Performed by: INTERNAL MEDICINE

## 2019-04-01 PROCEDURE — 96374 THER/PROPH/DIAG INJ IV PUSH: CPT

## 2019-04-01 PROCEDURE — 96361 HYDRATE IV INFUSION ADD-ON: CPT

## 2019-04-01 PROCEDURE — 99284 EMERGENCY DEPT VISIT MOD MDM: CPT | Mod: Z6 | Performed by: INTERNAL MEDICINE

## 2019-04-01 PROCEDURE — 99284 EMERGENCY DEPT VISIT MOD MDM: CPT | Mod: 25

## 2019-04-01 PROCEDURE — 25000128 H RX IP 250 OP 636: Performed by: INTERNAL MEDICINE

## 2019-04-01 PROCEDURE — 36415 COLL VENOUS BLD VENIPUNCTURE: CPT | Performed by: INTERNAL MEDICINE

## 2019-04-01 RX ORDER — KETOROLAC TROMETHAMINE 15 MG/ML
15 INJECTION, SOLUTION INTRAMUSCULAR; INTRAVENOUS ONCE
Status: COMPLETED | OUTPATIENT
Start: 2019-04-01 | End: 2019-04-01

## 2019-04-01 RX ADMIN — SODIUM CHLORIDE 1000 ML: 9 INJECTION, SOLUTION INTRAVENOUS at 07:22

## 2019-04-01 RX ADMIN — KETOROLAC TROMETHAMINE 15 MG: 15 INJECTION, SOLUTION INTRAMUSCULAR; INTRAVENOUS at 08:01

## 2019-04-01 ASSESSMENT — MIFFLIN-ST. JEOR: SCORE: 1552.79

## 2019-04-01 NOTE — ED NOTES
Discharge instructions completed with patient with no questions or concerns. Vital signs stable. To follow up with PCP.

## 2019-04-01 NOTE — LETTER
April 1, 2019      To Whom It May Concern:      Shantanu Martinez was seen in our Emergency Department today, 04/01/19.  I expect his condition to improve over the next  day.  He may return to work/school when improved.    Sincerely,        Johnny Lyons MD

## 2019-04-01 NOTE — ED AVS SNAPSHOT
[facility]  [address] [address]  Phone:  [phone]                                    Shantanu Martinez   MRN: [id number]    Department:  [facility]   Date of Visit:  4/1/2019           After Visit Summary Signature Page    I have received my discharge instructions, and my questions have been answered. I have discussed any challenges I see with this plan with the nurse or doctor.    ..........................................................................................................................................  Patient/Patient Representative Signature      ..........................................................................................................................................  Patient Representative Print Name and Relationship to Patient    ..................................................               ................................................  Date                                   Time    ..........................................................................................................................................  Reviewed by Signature/Title    ...................................................              ..............................................  Date                                               Time          22EPIC Rev 08/18

## 2019-04-03 ASSESSMENT — ENCOUNTER SYMPTOMS
FLANK PAIN: 0
BLOOD IN STOOL: 0
CHEST TIGHTNESS: 0
SLEEP DISTURBANCE: 0
COLOR CHANGE: 0
DIAPHORESIS: 0
VOMITING: 0
ABDOMINAL PAIN: 0
COUGH: 0
VOICE CHANGE: 0
FREQUENCY: 0
SHORTNESS OF BREATH: 0
CONFUSION: 0
BACK PAIN: 0
DIARRHEA: 1
MYALGIAS: 1
ANAL BLEEDING: 0
LIGHT-HEADEDNESS: 0
WHEEZING: 0
FEVER: 0
ABDOMINAL DISTENTION: 0
DYSURIA: 0
NECK PAIN: 0
NAUSEA: 0
WEAKNESS: 0
CHILLS: 0
NUMBNESS: 0
PALPITATIONS: 0
DIZZINESS: 1
HEADACHES: 0

## 2019-04-04 NOTE — ED PROVIDER NOTES
History     Chief Complaint   Patient presents with     Dizziness     felt dizzy  while driving to work, 7 loose stools since 0300     The history is provided by the patient.   Diarrhea   Quality:  Watery  Severity:  Moderate  Onset quality:  Sudden  Timing:  Constant  Progression:  Improving  Associated symptoms: myalgias    Associated symptoms: no abdominal pain, no chills, no diaphoresis, no fever, no headaches and no vomiting      Sadiq    Allergies:  Allergies   Allergen Reactions     Amoxicillin Rash     Demerol Rash       Problem List:    Patient Active Problem List    Diagnosis Date Noted     Encounter for donation of kidney 02/06/2018     Priority: Medium     Transplant donor evaluation 05/19/2015     Priority: Medium     Hydrocele, right 08/06/2013     Priority: Medium        Past Medical History:    Past Medical History:   Diagnosis Date     Asthma      Heart murmur        Past Surgical History:    Past Surgical History:   Procedure Laterality Date     HERNIORRHAPHY INGUINAL  8/12/2013    Procedure: HERNIORRHAPHY INGUINAL;  Right Inguinal Hernia Repair;  Surgeon: Triston Sandhu MD;  Location: [facility] OR     HYDROCELECTOMY INGUINAL  8/12/2013    Procedure: HYDROCELECTOMY INGUINAL;  RIGHT HYDROCELECTOMY;  Surgeon: Triston Sandhu MD;  Location: [facility] OR     LAPAROSCOPIC DONOR HAND ASSISTED KIDNEY LIVING UNRELATED N/A 2/6/2018    Procedure: LAPAROSCOPIC DONOR HAND ASSISTED KIDNEY LIVING UNRELATED;  Laparoscopic Hand Assisted Living Unrelated Kidney Donor, anesthesia Block;  Surgeon: Osbaldo Hooker MD;  Location: [facility] OR       Family History:    No family history on file.    Social History:  Marital Status:   [2]  Social History     Tobacco Use     Smoking status: Never Smoker     Smokeless tobacco: Never Used   Substance Use Topics     Alcohol use: No     Drug use: No        Medications:      multivitamin, therapeutic with minerals (THERA-VIT-M) TABS   UNKNOWN TO PATIENT   UNKNOWN TO PATIENT  "        Review of Systems   Constitutional: Negative for chills, diaphoresis and fever.   HENT: Negative for voice change.    Eyes: Negative for visual disturbance.   Respiratory: Negative for cough, chest tightness, shortness of breath and wheezing.    Cardiovascular: Negative for chest pain, palpitations and leg swelling.   Gastrointestinal: Positive for diarrhea. Negative for abdominal distention, abdominal pain, anal bleeding, blood in stool, nausea and vomiting.   Genitourinary: Negative for decreased urine volume, dysuria, flank pain and frequency.   Musculoskeletal: Positive for myalgias. Negative for back pain, gait problem and neck pain.   Skin: Negative for color change, pallor and rash.   Neurological: Positive for dizziness. Negative for syncope, weakness, light-headedness, numbness and headaches.   Psychiatric/Behavioral: Negative for confusion, sleep disturbance and suicidal ideas.       Physical Exam   BP: 100/65  Pulse: 59  Heart Rate: 55  Temp: 96.4  F (35.8  C)  Resp: 16  Height: 167.6 cm (5' 6\")  Weight: 63.5 kg (140 lb)(stated)  SpO2: 98 %  Lying Orthostatic BP: 105/68(Pt states he feels dizzy with position changes. )  Lying Orthostatic Pulse: 53 bpm  Sitting Orthostatic BP: 106/69  Sitting Orthostatic Pulse: 55 bpm  Standing Orthostatic BP: 108/78  Standing Orthostatic Pulse: 74 bpm      Physical Exam   Constitutional: He is oriented to person, place, and time. He appears well-developed and well-nourished.   HENT:   Head: Normocephalic and atraumatic.   Mouth/Throat: No oropharyngeal exudate.   Eyes: Conjunctivae are normal. Pupils are equal, round, and reactive to light.   Neck: Normal range of motion. Neck supple. No JVD present. No tracheal deviation present. No thyromegaly present.   Cardiovascular: Normal rate, regular rhythm, normal heart sounds and intact distal pulses. Exam reveals no gallop and no friction rub.   No murmur heard.  Pulmonary/Chest: Effort normal and breath sounds normal. " No stridor. No respiratory distress. He has no wheezes. He has no rales. He exhibits no tenderness.   Abdominal: Soft. Bowel sounds are normal. He exhibits no distension and no mass. There is no tenderness. There is no rebound and no guarding.   Musculoskeletal: Normal range of motion. He exhibits no edema or tenderness.   Lymphadenopathy:     He has no cervical adenopathy.   Neurological: He is alert and oriented to person, place, and time.   Skin: Skin is warm and dry. No rash noted. No erythema. No pallor.   Psychiatric: His behavior is normal.   Nursing note and vitals reviewed.      ED Course        Procedures                   No results found for this or any previous visit (from the past 24 hour(s)).    Medications   0.9% sodium chloride BOLUS (0 mLs Intravenous Stopped 4/1/19 0758)   ketorolac (TORADOL) injection 15 mg (15 mg Intravenous Given 4/1/19 0801)       Assessments & Plan (with Medical Decision Making)   Came for 5-6 epidsode of watery diarrhea, generalized body ache  Dizziness  Symptoms irmproved after receiving IVF, Toradol  Tolerated po intake in ER  I advised oral hydration in ER  Return to ER if symptoms persisted  She understood and agreed.    I have reviewed the nursing notes.    I have reviewed the findings, diagnosis, plan and need for follow up with the patient.         Medication List      There are no discharge medications for this visit.         Final diagnoses:   Viral gastroenteritis       4/1/2019   [facility]     Johnny Lyons MD  04/03/19 [id number]

## 2019-08-09 ENCOUNTER — APPOINTMENT (OUTPATIENT)
Dept: OCCUPATIONAL MEDICINE | Facility: OTHER | Age: 29
End: 2019-08-09

## 2019-08-09 PROCEDURE — 99000 SPECIMEN HANDLING OFFICE-LAB: CPT

## 2021-03-30 ENCOUNTER — HOSPITAL ENCOUNTER (EMERGENCY)
Facility: HOSPITAL | Age: 31
Discharge: HOME | End: 2021-03-30
Attending: NURSE PRACTITIONER | Admitting: NURSE PRACTITIONER
Payer: OTHER MISCELLANEOUS

## 2021-03-30 VITALS
RESPIRATION RATE: 16 BRPM | DIASTOLIC BLOOD PRESSURE: 75 MMHG | SYSTOLIC BLOOD PRESSURE: 119 MMHG | HEART RATE: 69 BPM | TEMPERATURE: 97.8 F | OXYGEN SATURATION: 97 %

## 2021-03-30 DIAGNOSIS — K08.419 PARTIAL LOSS OF TOOTH DUE TO TRAUMA: ICD-10-CM

## 2021-03-30 DIAGNOSIS — S01.81XA FACIAL LACERATION, INITIAL ENCOUNTER: ICD-10-CM

## 2021-03-30 DIAGNOSIS — S01.81XA FACIAL LACERATION: Primary | ICD-10-CM

## 2021-03-30 PROCEDURE — 250N000009 HC RX 250: Performed by: NURSE PRACTITIONER

## 2021-03-30 PROCEDURE — 12011 RPR F/E/E/N/L/M 2.5 CM/<: CPT | Performed by: NURSE PRACTITIONER

## 2021-03-30 PROCEDURE — 250N000011 HC RX IP 250 OP 636: Performed by: NURSE PRACTITIONER

## 2021-03-30 PROCEDURE — 12011 RPR F/E/E/N/L/M 2.5 CM/<: CPT

## 2021-03-30 PROCEDURE — G0463 HOSPITAL OUTPT CLINIC VISIT: HCPCS | Mod: 25

## 2021-03-30 RX ADMIN — EPINEPHRINE BITARTRATE 5 ML: 1 POWDER at 12:31

## 2021-03-30 NOTE — DISCHARGE INSTRUCTIONS
Declined Tdap- If change mind follow up at the clinic for a nurse only visit.     Follow up with your local dentist regarding broken tooth.    1. Keep stitches absolutely dry for first 24 hours, then you may wash and shower as you normally would. Avoid soaking stitches as this can slow down healing and raise your chance of getting an infection.   2. Do not wash area for 24 hours after sutures were placed  3. After 24 hours you may remove the dressing and keep wound open to air  4. Bathing is permitted after 48 hours from suture placement  5. Use Tylenol or Ibuprofen for comfort.  6. Watch for signs of infection such as:    increased pain after 24 hours   Increased temperature   Redness or swelling   Yellow or greenish discharge   Foul odor  7. Return to clinic if any of the above signs are present  8. Return to clinic in 5 days for suture removal    Follow up with primary care provider or return to urgent care/ED with any worsening in condition or additional concerns.

## 2021-03-30 NOTE — ED NOTES
Pt states he think his tdap is more updated than the 2004. Refused to have it updated here today when asked. Pt states he does have a headache. No blurred vision or dizziness. Denies LOC.
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
## 2021-03-30 NOTE — ED PROVIDER NOTES
"  History     Chief Complaint   Patient presents with     Laceration     HPI  Shantanu Martinez is a 30 year old male who presents to urgent care (Ambulatory) for complaints of a left cheek laceration and broken left front incisor.  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  Headache 8/10-declines pain medication states \"im good.\"  Has not taken anything for discomfort.  Denies  Vomiting, LOC, dizziness, blurred, vision, nose bleeds, decreased concentration.  Denies fever chills, nausea, vomiting, diarrhea, SOB or chest pain.  COVID vaccine #2 completed on 3/2/2021 (over 14 days since administration).  Tdap last 10/5/2004, declines.  Patient able to get into his dentist in [place] after he leaves urgent care.  No other concerns.      Allergies:  Allergies   Allergen Reactions     Amoxicillin Rash     Demerol Rash       Problem List:    Patient Active Problem List    Diagnosis Date Noted     Encounter for donation of kidney 02/06/2018     Priority: Medium     Transplant donor evaluation 05/19/2015     Priority: Medium     Hydrocele, right 08/06/2013     Priority: Medium        Past Medical History:    Past Medical History:   Diagnosis Date     Asthma      Heart murmur        Past Surgical History:    Past Surgical History:   Procedure Laterality Date     HERNIORRHAPHY INGUINAL  8/12/2013    Procedure: HERNIORRHAPHY INGUINAL;  Right Inguinal Hernia Repair;  Surgeon: Triston Sandhu MD;  Location: [facility]     HYDROCELECTOMY INGUINAL  8/12/2013    Procedure: HYDROCELECTOMY INGUINAL;  RIGHT HYDROCELECTOMY;  Surgeon: Triston Sandhu MD;  Location: [facility]     LAPAROSCOPIC DONOR HAND ASSISTED KIDNEY LIVING UNRELATED N/A 2/6/2018    Procedure: LAPAROSCOPIC DONOR HAND ASSISTED KIDNEY LIVING UNRELATED;  Laparoscopic Hand Assisted Living Unrelated Kidney Donor, anesthesia Block;  Surgeon: Osbaldo Hooker MD;  Location: [facility] OR       Family " History:    No family history on file.    Social History:  Marital Status:   [2]  Social History     Tobacco Use     Smoking status: Never Smoker     Smokeless tobacco: Never Used   Substance Use Topics     Alcohol use: No     Drug use: No        Medications:    multivitamin, therapeutic with minerals (THERA-VIT-M) TABS  UNKNOWN TO PATIENT  UNKNOWN TO PATIENT      Review of Systems   Constitutional: Negative for chills and fever.   HENT: Positive for dental problem (Broken left upper front tooth). Negative for ear pain and trouble swallowing.    Eyes: Negative for photophobia, pain and visual disturbance.   Respiratory: Negative for shortness of breath.    Cardiovascular: Negative for chest pain.   Gastrointestinal: Negative for diarrhea, nausea and vomiting.   Musculoskeletal: Negative for gait problem.   Skin: Positive for wound (laceration left cheek).   Neurological: Positive for headaches. Negative for dizziness, facial asymmetry and weakness.   Psychiatric/Behavioral: Negative.        Physical Exam   BP: 119/75  Pulse: 69  Temp: 97.8  F (36.6  C)  Resp: 16  SpO2: 97 %      Physical Exam  Vitals signs and nursing note reviewed.   HENT:      Head: Normocephalic.      Mouth/Throat:      Mouth: Mucous membranes are moist.      Pharynx: Oropharynx is clear.        Comments: Chipped tooth #9 with possible dentin exposure.  Eyes:      Extraocular Movements: Extraocular movements intact.      Conjunctiva/sclera: Conjunctivae normal.      Pupils: Pupils are equal, round, and reactive to light.   Neck:      Musculoskeletal: Normal range of motion and neck supple.   Cardiovascular:      Rate and Rhythm: Normal rate and regular rhythm.      Pulses: Normal pulses.      Heart sounds: Normal heart sounds.   Pulmonary:      Effort: Pulmonary effort is normal.      Breath sounds: Normal breath sounds.   Skin:     General: Skin is warm and dry.      Findings: Laceration present.      Comments: 1in laceration to left  cheek with moderate amount of swelling.  No active bleeding upon arrival.     Neurological:      Mental Status: He is alert.   Psychiatric:         Mood and Affect: Mood normal.         ED Course        [facility]    -Laceration Repair    Date/Time: 4/1/2021 8:27 AM  Performed by: Luisa Negrete NP  Authorized by: Luisa Negrete NP       ANESTHESIA (see MAR for exact dosages):     Anesthesia method:  Topical application and local infiltration    Topical anesthetic:  LET    Local anesthetic:  Lidocaine 1% w/o epi  LACERATION DETAILS     Location:  Face    Face location:  L cheek    Length (cm):  2.5    REPAIR TYPE:     Repair type:  Simple      EXPLORATION:     Wound exploration: wound explored through full range of motion and entire depth of wound probed and visualized      Contaminated: yes      TREATMENT:     Area cleansed with:  Hibiclens    Amount of cleaning:  Standard    Irrigation solution:  Sterile saline    Irrigation volume:  50    Irrigation method:  Syringe    Visualized foreign bodies/material removed: no      SKIN REPAIR     Repair method:  Sutures    Suture size:  5-0    Suture technique:  Simple interrupted    Number of sutures:  7    APPROXIMATION     Approximation:  Close    POST-PROCEDURE DETAILS     Dressing:  Antibiotic ointment and sterile dressing      PROCEDURE   Patient Tolerance:  Patient tolerated the procedure well with no immediate complications        No results found for this or any previous visit (from the past 24 hour(s)).    Medications   lidocaine/EPINEPHrine/tetracaine (LET) solution KIT (5 mLs Topical Given 3/30/21 1231)       Assessments & Plan (with Medical Decision Making)     I have reviewed the nursing notes.    I have reviewed the findings, diagnosis, plan and need for follow up with the patient.  (S01.81XA) Facial laceration  (primary encounter diagnoses  Plan:   1 in laceration to left cheek with moderate amount of swelling.  No active bleeding  upon arrival.  Cleansed with hibiclens, NS and flushed with 50cc NS.  LET gel and lidocaine 1% injected prior to suture repair.  7 sutures placed.  Triple antibiotic ointment placed and sterile dressing.  Patient tolerated well.      1. Keep stitches absolutely dry for first 24 hours, then you may wash and shower as you normally would. Avoid soaking stitches as this can slow down healing and raise your chance of getting an infection.   2. Do not wash area for 24 hours after sutures were placed  3. After 24 hours you may remove the dressing and keep wound open to air  4. Bathing is permitted after 48 hours from suture placement  5. Use Tylenol or Ibuprofen for comfort.  6. Watch for signs of infection such as:    increased pain after 24 hours   Increased temperature   Redness or swelling   Yellow or greenish discharge   Foul odor  7. Return to clinic if any of the above signs are present  8. Return to clinic in 5 days for suture removal    (K08.419) Partial loss of tooth due to trauma  Plan:   Left front upper incisor (tooth # 9) chipped with possible dentin exposure.  Patient is on way to [place] dentist post discharge as they are able to fit him in.      Follow up with primary care provider or return to urgent care/ED with any worsening in condition or additional concerns.    Patient in agreement with treatment plan.     Discharge Medication List as of 3/30/2021  1:26 PM          Final diagnoses:   Facial laceration   Partial loss of tooth due to trauma     3/30/2021   [facility]     Luisa Negrete NP  04/01/21 0845

## 2021-03-30 NOTE — ED TRIAGE NOTES
Pt states he was working and turned his head while working and into a fender causing a laceration on his left cheek.

## 2021-04-01 ASSESSMENT — ENCOUNTER SYMPTOMS
WOUND: 1
WEAKNESS: 0
PHOTOPHOBIA: 0
SHORTNESS OF BREATH: 0
PSYCHIATRIC NEGATIVE: 1
DIZZINESS: 0
VOMITING: 0
FEVER: 0
TROUBLE SWALLOWING: 0
HEADACHES: 1
CHILLS: 0
NAUSEA: 0
DIARRHEA: 0
FACIAL ASYMMETRY: 0
EYE PAIN: 0

## 2021-11-18 ENCOUNTER — APPOINTMENT (OUTPATIENT)
Dept: OCCUPATIONAL MEDICINE | Facility: OTHER | Age: 31
End: 2021-11-18

## 2021-11-18 ENCOUNTER — APPOINTMENT (OUTPATIENT)
Dept: CHIROPRACTIC MEDICINE | Facility: OTHER | Age: 31
End: 2021-11-18

## 2021-11-18 PROCEDURE — 99499 UNLISTED E&M SERVICE: CPT

## 2022-03-24 ENCOUNTER — APPOINTMENT (OUTPATIENT)
Dept: OCCUPATIONAL MEDICINE | Facility: OTHER | Age: 32
End: 2022-03-24

## 2022-09-18 NOTE — PROGRESS NOTES
Medicare Wellness Visit, Female     The best way to live healthy is to have a lifestyle where you eat a well-balanced diet, exercise regularly, limit alcohol use, and quit all forms of tobacco/nicotine, if applicable. Regular preventive services are another way to keep healthy. Preventive services (vaccines, screening tests, monitoring & exams) can help personalize your care plan, which helps you manage your own care. Screening tests can find health problems at the earliest stages, when they are easiest to treat. [facility] follows the current, evidence-based guidelines published by the [facility] ([facility]) when recommending preventive services for our patients. Because we follow these guidelines, sometimes recommendations change over time as research supports it. (For example, mammograms used to be recommended annually. Even though Medicare will still pay for an annual mammogram, the newer guidelines recommend a mammogram every two years for women of average risk). Of course, you and your doctor may decide to screen more often for some diseases, based on your risk and your co-morbidities (chronic disease you are already diagnosed with). Preventive services for you include:  - Medicare offers their members a free annual wellness visit, which is time for you and your primary care provider to discuss and plan for your preventive service needs. Take advantage of this benefit every year!  -All adults over the age of 72 should receive the recommended pneumonia vaccines. Current USPSTF guidelines recommend a series of two vaccines for the best pneumonia protection.   -All adults should have a flu vaccine yearly and a tetanus vaccine every 10 years.   -All adults age 48 and older should receive the shingles vaccines (series of two vaccines).       -All adults age 38-68 who are overweight should have a diabetes screening test once every three years.   -All Transplant Surgery Kidney Donor Progress Note    Medical record number: [id number]  YOB: 1990,   Date of Visit:  03/12/2018  For followup after kidney donation.    Assessment and Recommendations: Mr. Martinez is doing fairly well after kidney donation.     1. Midline wound infection - needs regular packing and dressing change. Wound swab sent for microbiology.  2. Followup: commitment to healthy lifestyle and routine health exams as recommended for age and gender.   6 mo, 1 yr, and 2 yr followup per routine.         Kaitlynn Urbano MD  Fellow,  Division of Transplantation  [id number]    Patient was counseled on complications of incision and short and long term care to minimize infection/hernia risk.      Total time: 15 minutes  Counselling time: 10 minutes  .    ---------------------------------------------------------------------------------------------------    S: Mr. Martinez donate a kidney 5 weeks ago and is doing fairly well, reporting no fevers, chills, dysuria.  He is not taking narcotic analgesia.  He is back to partaking in routine activities of daily living. Post-donation concerns are: wound infection and delayed healing.    Medications:  Prescription Medications as of 3/12/2018             oxyCODONE IR (ROXICODONE) 5 MG tablet Take 1 tablet (5 mg) by mouth every 3 hours as needed for other (pain control or improvement in physical function. Hold dose for analgesic side effects.)    acetaminophen (TYLENOL) 325 MG tablet Take 2 tablets (650 mg) by mouth every 4 hours as needed for other (multimodal surgical pain management along with NSAIDS and opioid medication as indicated based on pain control and physical function.)    ondansetron (ZOFRAN-ODT) 4 MG ODT tab Take 1 tablet (4 mg) by mouth every 6 hours as needed for nausea or vomiting    scopolamine (TRANSDERM) 72 hr patch Place 1 patch onto the skin every 72 hours    polyethylene glycol (MIRALAX/GLYCOLAX) Packet Take 17 g by mouth 2 times daily     adults born between [birth year] and 1964 should be screened once for Hepatitis C.  -Other screening tests and preventive services for persons with diabetes include: an eye exam to screen for diabetic retinopathy, a kidney function test, a foot exam, and stricter control over your cholesterol.   -Cardiovascular screening for adults with routine risk involves an electrocardiogram (ECG) at intervals determined by your doctor.   -Colorectal cancer screenings should be done for adults age 54-65 with no increased risk factors for colorectal cancer. There are a number of acceptable methods of screening for this type of cancer. Each test has its own benefits and drawbacks. Discuss with your doctor what is most appropriate for you during your annual wellness visit. The different tests include: colonoscopy (considered the best screening method), a fecal occult blood test, a fecal DNA test, and sigmoidoscopy.    -A bone mass density test is recommended when a woman turns 65 to screen for osteoporosis. This test is only recommended one time, as a screening. Some providers will use this same test as a disease monitoring tool if you already have osteoporosis. -Breast cancer screenings are recommended every other year for women of normal risk, age 54-69.  -Cervical cancer screenings for women over age 72 are only recommended with certain risk factors. Here is a list of your current Health Maintenance items (your personalized list of preventive services) with a due date:  Health Maintenance Due   Topic Date Due    COVID-19 Vaccine (3 - Booster for Moderna series) 09/30/2021    Cholesterol Test   08/06/2022    Yearly Flu Vaccine (1) 09/01/2022         Vaccine Information Statement    Influenza (Flu) Vaccine (Inactivated or Recombinant): What You Need to Know    Many vaccine information statements are available in [place] and other languages. See www.immunize.org/vis.   Hojas de información sobre vacunas están disponibles en español senna-docusate (SENOKOT-S;PERICOLACE) 8.6-50 MG per tablet Take 2 tablets by mouth 2 times daily    multivitamin, therapeutic with minerals (THERA-VIT-M) TABS Take 1 tablet by mouth daily          Exam:   Temp:  [98.1  F (36.7  C)] 98.1  F (36.7  C)  Pulse:  [65] 65  BP: (105)/(70) 105/70  SpO2:  [95 %] 95 %  General Appearance: in no apparent distress.   Skin: Normal, no rashes or jaundice  Heart: regular rate and rhythm, normal S1 and S2, no murmur  Lungs: easy respirations, no audible wheezing.  Abdomen: flat, The wound has 1 cm in length opening above umbilicus with minimal discharge , without hernia. The abdomen is non-tender. The   Extremities: no edema     Labs:   Recent Labs   Lab Test  02/07/18   0650   08/03/17   0908   BUN  21   --   16   CR  1.94*   < >  1.10   GFRESTIMATED  42*   < >  81   GLC   --    --   93    < > = values in this interval not displayed.     Recent Labs   Lab Test  02/09/18   1050  02/05/18   1116   COLOR  Light Yellow  Yellow   APPEARANCE  Clear  Cloudy   URINEGLC  Negative  Negative   URINEBILI  Negative  Negative   URINEKETONE  Negative  Negative   SG  1.005  1.017   UBLD  Negative  Negative   URINEPH  7.0  7.0   PROTEIN  Negative  Negative   NITRITE  Negative  Negative   LEUKEST  Negative  Negative   RBCU   --   0   WBCU   --   0     Recent Labs   Lab Test  08/03/17   0902   UTPG  0.05        y en muchos otros idiomas. Visite www.immunize.org/vis. 1. Why get vaccinated? Influenza vaccine can prevent influenza (flu). Flu is a contagious disease that spreads around the [place] every year, usually between October and May. Anyone can get the flu, but it is more dangerous for some people. Infants and young children, people 72 years and older, pregnant people, and people with certain health conditions or a weakened immune system are at greatest risk of flu complications. Pneumonia, bronchitis, sinus infections, and ear infections are examples of flu-related complications. If you have a medical condition, such as heart disease, cancer, or diabetes, flu can make it worse. Flu can cause fever and chills, sore throat, muscle aches, fatigue, cough, headache, and runny or stuffy nose. Some people may have vomiting and diarrhea, though this is more common in children than adults. In an average year, thousands of people in the [facility] die from flu, and many more are hospitalized. Flu vaccine prevents millions of illnesses and flu-related visits to the doctor each year. 2. Influenza vaccines     CDC recommends everyone 6 months and older get vaccinated every flu season. Children 6 months through 6years of age may need 2 doses during a single flu season. Everyone else needs only 1 dose each flu season. It takes about 2 weeks for protection to develop after vaccination. There are many flu viruses, and they are always changing. Each year a new flu vaccine is made to protect against the influenza viruses believed to be likely to cause disease in the upcoming flu season. Even when the vaccine doesnt exactly match these viruses, it may still provide some protection. Influenza vaccine does not cause flu. Influenza vaccine may be given at the same time as other vaccines.     3. Talk with your health care provider    Tell your vaccination provider if the person getting the vaccine:  Has had an allergic reaction after a previous dose of influenza vaccine, or has any severe, life-threatening allergies   Has ever had Guillain-Barré Syndrome (also called GBS)    In some cases, your health care provider may decide to postpone influenza vaccination until a future visit. Influenza vaccine can be administered at any time during pregnancy. People who are or will be pregnant during influenza season should receive inactivated influenza vaccine. People with minor illnesses, such as a cold, may be vaccinated. People who are moderately or severely ill should usually wait until they recover before getting influenza vaccine. Your health care provider can give you more information. 4. Risks of a vaccine reaction    Soreness, redness, and swelling where the shot is given, fever, muscle aches, and headache can happen after influenza vaccination. There may be a very small increased risk of Guillain-Barré Syndrome (GBS) after inactivated influenza vaccine (the flu shot). [place] children who get the flu shot along with pneumococcal vaccine (PCV13) and/or DTaP vaccine at the same time might be slightly more likely to have a seizure caused by fever. Tell your health care provider if a child who is getting flu vaccine has ever had a seizure. People sometimes faint after medical procedures, including vaccination. Tell your provider if you feel dizzy or have vision changes or ringing in the ears. As with any medicine, there is a very remote chance of a vaccine causing a severe allergic reaction, other serious injury, or death. 5. What if there is a serious problem? An allergic reaction could occur after the vaccinated person leaves the clinic.  If you see signs of a severe allergic reaction (hives, swelling of the face and throat, difficulty breathing, a fast heartbeat, dizziness, or weakness), call 9-1-1 and get the person to the nearest hospital.    For other signs that concern you, call your health care provider. Adverse reactions should be reported to the Vaccine Adverse Event Reporting System (VAERS). Your health care provider will usually file this report, or you can do it yourself. Visit the VAERS website at www.vaers. [facility].gov or call 4-603.758.5116. VAERS is only for reporting reactions, and VAERS staff members do not give medical advice. 6. The National Vaccine Injury Compensation Program    The [facility] Vaccine Injury Compensation Program (VICP) is a federal program that was created to compensate people who may have been injured by certain vaccines. Claims regarding alleged injury or death due to vaccination have a time limit for filing, which may be as short as two years. Visit the VICP website at www.[facility].gov/vaccinecompensation or call 5-454.239.7370 to learn about the program and about filing a claim. 7. How can I learn more? Ask your health care provider. Call your local or state health department. Visit the website of the Food and Drug Administration (FDA) for vaccine package inserts and additional information at www.fda.gov/vaccines-blood-biologics/vaccines. Contact the Centers for Disease Control and Prevention (CDC): Call 3-237.124.6527 (8-770-AOP-INFO) or  Visit CDCs influenza website at www.cdc.gov/flu. Vaccine Information Statement   Inactivated Influenza Vaccine   8/6/2021  42 NORAH Rola [address] [id number]   Department of Health and Human Services  Centers for Disease Control and Prevention    Office Use Only

## 2024-08-05 NOTE — MR AVS SNAPSHOT
"              After Visit Summary   3/12/2018    Shantanu Martinez    MRN: [id number]           Patient Information     Date Of Birth          1990        Visit Information        Provider Department      3/12/2018 2:00 PM Osbaldo Hooker MD [facility]        Today's Diagnoses     Kidney donor           Follow-ups after your visit        Future tests that were ordered for you today     Open Future Orders        Priority Expected Expires Ordered    Anaerobic bacterial culture Routine  3/12/2019 3/12/2018    Other Specialty Referral Routine 3/12/2018 2/28/2019 3/12/2018            Who to contact     If you have questions or need follow up information about today's clinic visit or your schedule please contact [facility] directly at [phone].  Normal or non-critical lab and imaging results will be communicated to you by [employer], letter or phone within 4 business days after the clinic has received the results. If you do not hear from us within 7 days, please contact the clinic through [employer] or phone. If you have a critical or abnormal lab result, we will notify you by phone as soon as possible.  Submit refill requests through [employer] or call your pharmacy and they will forward the refill request to us. Please allow 3 business days for your refill to be completed.          Additional Information About Your Visit        [employer] Information     [employer] lets you send messages to your doctor, view your test results, renew your prescriptions, schedule appointments and more. To sign up, go to [email or web address] . Click on \"Log in\" on the left side of the screen, which will take you to the Welcome page. Then click on \"Sign up Now\" on the right side of the page.     You will be asked to enter the access code listed below, as well as some personal information. Please follow the directions to create your username and password.     Your access code is: [email or web address]  Expires: " "2018  2:44 PM     Your access code will  in 90 days. If you need help or a new code, please call your [facility] or [phone].        Care EveryWhere ID     This is your Care EveryWhere ID. This could be used by other organizations to access your [facility] medical records  [id number]        Your Vitals Were     Pulse Temperature Height Pulse Oximetry BMI (Body Mass Index)       65 98.1  F (36.7  C) 1.727 m (5' 8\") 95% 20.86 kg/m2        Blood Pressure from Last 3 Encounters:   18 105/70   18 119/75   18 119/80    Weight from Last 3 Encounters:   18 62.2 kg (137 lb 3.2 oz)   18 61.1 kg (134 lb 9.6 oz)   18 65.1 kg (143 lb 9.6 oz)              We Performed the Following     Anaerobic bacterial culture     Fungus Culture, non-blood     Gram stain     Wound Culture Aerobic Bacterial        Primary Care Provider Office Phone # Fax #    Michael Zelaya [phone]       [facility] [address] [address]        Equal Access to Services     [facility] : Hadii aakash ku hadasho Soomaali, waaxda luqadaha, qaybta kaalmada adeegyada, dee guerreron ragini oscar . So [facility] [phone].    ATENCIÓN: Si habla español, tiene a biggs disposición servicios gratuitos de asistencia lingüística. Llame al [phone].    We comply with applicable federal civil rights laws and [place] laws. We do not discriminate on the basis of race, color, national origin, age, disability, sex, sexual orientation, or gender identity.            Thank you!     Thank you for choosing [facility]  for your care. Our goal is always to provide you with excellent care. Hearing back from our patients is one way we can continue to improve our services. Please take a few minutes to complete the written survey that you may receive in the mail after your visit with us. Thank you!             Your Updated Medication List - Protect others " around you: Learn how to safely use, store and throw away your medicines at www.disposemymeds.org.          This list is accurate as of 3/12/18  5:10 PM.  Always use your most recent med list.                   Brand Name Dispense Instructions for use Diagnosis    acetaminophen 325 MG tablet    TYLENOL    50 tablet    Take 2 tablets (650 mg) by mouth every 4 hours as needed for other (multimodal surgical pain management along with NSAIDS and opioid medication as indicated based on pain control and physical function.)    Encounter for donation of kidney       multivitamin, therapeutic with minerals Tabs tablet      Take 1 tablet by mouth daily        ondansetron 4 MG ODT tab    ZOFRAN-ODT    20 tablet    Take 1 tablet (4 mg) by mouth every 6 hours as needed for nausea or vomiting    Encounter for donation of kidney       oxyCODONE IR 5 MG tablet    ROXICODONE    20 tablet    Take 1 tablet (5 mg) by mouth every 3 hours as needed for other (pain control or improvement in physical function. Hold dose for analgesic side effects.)    Encounter for donation of kidney       polyethylene glycol Packet    MIRALAX/GLYCOLAX    7 packet    Take 17 g by mouth 2 times daily    Encounter for donation of kidney       scopolamine 72 hr patch    TRANSDERM    3 patch    Place 1 patch onto the skin every 72 hours    Encounter for donation of kidney       senna-docusate 8.6-50 MG per tablet    SENOKOT-S;PERICOLACE    50 tablet    Take 2 tablets by mouth 2 times daily    Encounter for donation of kidney          Smith Gonzalez(Resident)

## 2024-08-08 ENCOUNTER — TELEPHONE (OUTPATIENT)
Dept: TRANSPLANT | Facility: CLINIC | Age: 34
End: 2024-08-08

## 2024-08-08 ENCOUNTER — DOCUMENTATION ONLY (OUTPATIENT)
Dept: TRANSPLANT | Facility: CLINIC | Age: 34
End: 2024-08-08

## 2024-08-08 NOTE — TELEPHONE ENCOUNTER
Sadiq called today to say that his PCP sent him to a nephrologist in [place] who said he ws in stage 4 kidney failure.  I reviewed labs with Sadiq.  His creatinine in 1.33.  I reassured him that his creatinine is normal post kidney donation 6 years ago.  I will ask Deyanira to send in an email.  The study information explaining GFR of donors.  Will send to Lasha email.  He will share this with his providers.

## 2024-09-14 ENCOUNTER — HEALTH MAINTENANCE LETTER (OUTPATIENT)
Age: 34
End: 2024-09-14

## 2025-02-28 NOTE — PROGRESS NOTES
Report called to 7A. MDA called for sign out.    [Tired appearing] : tired appearing [Erythema] : erythema [Bulging] : bulging [Purulent Effusion] : purulent effusion [Clear Effusion] : clear effusion [Mucoid Discharge] : mucoid discharge [Rales] : rales [Transmitted Upper Airway Sounds] : transmitted upper airway sounds [NL] : soft, nontender, nondistended, normal bowel sounds, no hepatosplenomegaly

## 2025-07-19 ENCOUNTER — APPOINTMENT (OUTPATIENT)
Dept: CT IMAGING | Facility: OTHER | Age: 35
End: 2025-07-19
Attending: STUDENT IN AN ORGANIZED HEALTH CARE EDUCATION/TRAINING PROGRAM

## 2025-07-19 ENCOUNTER — HOSPITAL ENCOUNTER (EMERGENCY)
Facility: OTHER | Age: 35
Discharge: HOME | End: 2025-07-19
Attending: STUDENT IN AN ORGANIZED HEALTH CARE EDUCATION/TRAINING PROGRAM

## 2025-07-19 VITALS
OXYGEN SATURATION: 96 % | BODY MASS INDEX: 21.22 KG/M2 | TEMPERATURE: 98.3 F | HEIGHT: 68 IN | HEART RATE: 95 BPM | SYSTOLIC BLOOD PRESSURE: 102 MMHG | DIASTOLIC BLOOD PRESSURE: 69 MMHG | RESPIRATION RATE: 18 BRPM | WEIGHT: 140 LBS

## 2025-07-19 DIAGNOSIS — M54.6 THORACIC SPINE PAIN: ICD-10-CM

## 2025-07-19 DIAGNOSIS — G44.319 ACUTE POST-TRAUMATIC HEADACHE, NOT INTRACTABLE: ICD-10-CM

## 2025-07-19 DIAGNOSIS — M54.2 CERVICAL SPINE PAIN: ICD-10-CM
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
## 2025-07-19 LAB
HOLD SPECIMEN: NORMAL

## 2025-07-19 PROCEDURE — 70450 CT HEAD/BRAIN W/O DYE: CPT

## 2025-07-19 PROCEDURE — 72128 CT CHEST SPINE W/O DYE: CPT | Mod: 26 | Performed by: STUDENT IN AN ORGANIZED HEALTH CARE EDUCATION/TRAINING PROGRAM

## 2025-07-19 PROCEDURE — 99284 EMERGENCY DEPT VISIT MOD MDM: CPT | Mod: 25 | Performed by: STUDENT IN AN ORGANIZED HEALTH CARE EDUCATION/TRAINING PROGRAM

## 2025-07-19 PROCEDURE — 72125 CT NECK SPINE W/O DYE: CPT | Mod: 26 | Performed by: STUDENT IN AN ORGANIZED HEALTH CARE EDUCATION/TRAINING PROGRAM

## 2025-07-19 PROCEDURE — 250N000013 HC RX MED GY IP 250 OP 250 PS 637: Performed by: STUDENT IN AN ORGANIZED HEALTH CARE EDUCATION/TRAINING PROGRAM

## 2025-07-19 PROCEDURE — 72125 CT NECK SPINE W/O DYE: CPT

## 2025-07-19 PROCEDURE — 72128 CT CHEST SPINE W/O DYE: CPT

## 2025-07-19 PROCEDURE — 70450 CT HEAD/BRAIN W/O DYE: CPT | Mod: 26 | Performed by: STUDENT IN AN ORGANIZED HEALTH CARE EDUCATION/TRAINING PROGRAM

## 2025-07-19 PROCEDURE — 99283 EMERGENCY DEPT VISIT LOW MDM: CPT | Performed by: STUDENT IN AN ORGANIZED HEALTH CARE EDUCATION/TRAINING PROGRAM

## 2025-07-19 RX ORDER — ACETAMINOPHEN 500 MG
1000 TABLET ORAL ONCE
Status: COMPLETED | OUTPATIENT
Start: 2025-07-19 | End: 2025-07-19

## 2025-07-19 RX ADMIN — ACETAMINOPHEN 1000 MG: 500 TABLET, FILM COATED ORAL at 19:44

## 2025-07-19 ASSESSMENT — COLUMBIA-SUICIDE SEVERITY RATING SCALE - C-SSRS
1. IN THE PAST MONTH, HAVE YOU WISHED YOU WERE DEAD OR WISHED YOU COULD GO TO SLEEP AND NOT WAKE UP?: NO
2. HAVE YOU ACTUALLY HAD ANY THOUGHTS OF KILLING YOURSELF IN THE PAST MONTH?: NO
6. HAVE YOU EVER DONE ANYTHING, STARTED TO DO ANYTHING, OR PREPARED TO DO ANYTHING TO END YOUR LIFE?: NO

## 2025-07-19 ASSESSMENT — ACTIVITIES OF DAILY LIVING (ADL)
ADLS_ACUITY_SCORE: 41
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
## 2025-07-20 NOTE — DISCHARGE INSTRUCTIONS
CT scans did not show any concerning findings including head bleed or broken bones. You likely have a soft tissue injury/injuries such as a bruised muscle, tendon, and/or ligament. These injuries often worsen over first several days before improving. Use tylenol and nsaids (e.g. ibuprofen) and ice as needed for discomfort. Switch ice to heat after several days. Always take nsaids with food to avoid stomach ulcers. See PCP if not significantly improving after 1 week. Please review attached instructions including reasons to return to the emergency department.

## 2025-08-08 ENCOUNTER — APPOINTMENT (OUTPATIENT)
Dept: OCCUPATIONAL MEDICINE | Facility: OTHER | Age: 35
End: 2025-08-08

## (undated) DEVICE — LINEN TOWEL PACK X6 WHITE 5487

## (undated) DEVICE — ENDO TROCAR 12MM VERSAONE BLADELESS W/STD FIX CAN NONB12STF

## (undated) DEVICE — SU SILK 3-0 TIE 12X30" A304H

## (undated) DEVICE — SU VICRYL 0 TIE 54" J608H

## (undated) DEVICE — ENDO GELPORT 100/120MM C8XX2

## (undated) DEVICE — ESU GROUND PAD ADULT W/CORD E7507

## (undated) DEVICE — BNDG ABDOMINAL BINDER 9X45-62" 79-89071

## (undated) DEVICE — BLADE CLIPPER SGL USE 9680

## (undated) DEVICE — Device

## (undated) DEVICE — CLIP ENDO HEMO-LOC PURPLE LG 544240

## (undated) DEVICE — SOL WATER IRRIG 1000ML BOTTLE 2F7114

## (undated) DEVICE — TUBING IRRIG CYSTO/BLADDER SET 81" LF 2C4040

## (undated) DEVICE — LINEN TOWEL PACK X5 5464

## (undated) DEVICE — CLIP APPLIER ENDO ROTATING 10MM MED/LG ER320

## (undated) DEVICE — LINEN GOWN XLG 5407

## (undated) DEVICE — DRAPE ISOLATION BAG 1003

## (undated) DEVICE — ADPT 5 IN 1 360

## (undated) DEVICE — ESU ENDO SCISSORS 5MM CVD 5DCS

## (undated) DEVICE — SUCTION IRR STRYKERFLOW II W/TIP 250-070-520

## (undated) DEVICE — SU VICRYL 3-0 SH 27" J316H

## (undated) DEVICE — SU SILK 4-0 TIE 12X30" A303H

## (undated) DEVICE — TUBING C02 INSUFFLATION LAP FILTER HEATER 6198

## (undated) DEVICE — BNDG ABDOMINAL BINDER 9X62-84" 79-89210

## (undated) DEVICE — CATH TRAY FOLEY SURESTEP 16FR W/URNE MTR STLK LATEX A303316A

## (undated) DEVICE — SU PROLENE 0 CT-1 30" 8424H

## (undated) DEVICE — SU DERMABOND ADVANCED .7ML DNX12

## (undated) DEVICE — STPL POWERED ECHELON VASC 35MM PVE35A

## (undated) DEVICE — GLOVE PROTEXIS MICRO 7.5  2D73PM75

## (undated) DEVICE — DRAPE SLUSH/WARMER 66X44" ORS-320

## (undated) DEVICE — ANTIFOG SOLUTION W/FOAM PAD 31142527

## (undated) DEVICE — ENDO TROCAR OPTICAL 12MM VERSAONE W/STD FIX CAN UNVCA12STF

## (undated) DEVICE — ENDO CLOSING KIT ENDOCLOSE 173022

## (undated) DEVICE — CLIP APPLIER ENDO ROTATING 12MM LG ER420

## (undated) DEVICE — SU MONOCRYL 4-0 PS-2 18" UND Y496G

## (undated) DEVICE — ESU HARMONIC LAPAROSCOPIC SHEARS HD 1000I 36CM HARHD36

## (undated) DEVICE — SU SILK 2-0 TIE 12X30" A305H

## (undated) DEVICE — PREP CHLORAPREP 26ML TINTED ORANGE  260815

## (undated) DEVICE — SU PDS II 0 TP-1 60" Z991G

## (undated) DEVICE — SOL NACL 0.9% INJ 1000ML BAG 07983-09

## (undated) DEVICE — WIPES FOLEY CARE SURESTEP PROVON DFC100

## (undated) RX ORDER — SCOLOPAMINE TRANSDERMAL SYSTEM 1 MG/1
PATCH, EXTENDED RELEASE TRANSDERMAL
Status: DISPENSED
Start: 2018-02-06

## (undated) RX ORDER — HEPARIN SODIUM 1000 [USP'U]/ML
INJECTION, SOLUTION INTRAVENOUS; SUBCUTANEOUS
Status: DISPENSED
Start: 2018-02-06

## (undated) RX ORDER — DEXAMETHASONE SODIUM PHOSPHATE 4 MG/ML
INJECTION, SOLUTION INTRA-ARTICULAR; INTRALESIONAL; INTRAMUSCULAR; INTRAVENOUS; SOFT TISSUE
Status: DISPENSED
Start: 2018-02-06

## (undated) RX ORDER — FENTANYL CITRATE 50 UG/ML
INJECTION, SOLUTION INTRAMUSCULAR; INTRAVENOUS
Status: DISPENSED
Start: 2018-02-06

## (undated) RX ORDER — GLYCOPYRROLATE 0.2 MG/ML
INJECTION, SOLUTION INTRAMUSCULAR; INTRAVENOUS
Status: DISPENSED
Start: 2018-02-06

## (undated) RX ORDER — PROTAMINE SULFATE 10 MG/ML
INJECTION, SOLUTION INTRAVENOUS
Status: DISPENSED
Start: 2018-02-06

## (undated) RX ORDER — CEFAZOLIN SODIUM 1 G/3ML
INJECTION, POWDER, FOR SOLUTION INTRAMUSCULAR; INTRAVENOUS
Status: DISPENSED
Start: 2018-02-06

## (undated) RX ORDER — CALCIUM CHLORIDE 100 MG/ML
INJECTION INTRAVENOUS; INTRAVENTRICULAR
Status: DISPENSED
Start: 2018-02-06

## (undated) RX ORDER — MANNITOL 20 G/100ML
INJECTION, SOLUTION INTRAVENOUS
Status: DISPENSED
Start: 2018-02-06

## (undated) RX ORDER — PROPOFOL 10 MG/ML
INJECTION, EMULSION INTRAVENOUS
Status: DISPENSED
Start: 2018-02-06

## (undated) RX ORDER — HYDRALAZINE HYDROCHLORIDE 20 MG/ML
INJECTION INTRAMUSCULAR; INTRAVENOUS
Status: DISPENSED
Start: 2018-02-06

## (undated) RX ORDER — ALBUMIN, HUMAN INJ 5% 5 %
SOLUTION INTRAVENOUS
Status: DISPENSED
Start: 2018-02-06

## (undated) RX ORDER — OXYMETAZOLINE HYDROCHLORIDE 0.05 G/100ML
SPRAY NASAL
Status: DISPENSED
Start: 2018-02-06

## (undated) RX ORDER — LEVOFLOXACIN 5 MG/ML
INJECTION, SOLUTION INTRAVENOUS
Status: DISPENSED
Start: 2018-02-06

## (undated) RX ORDER — FUROSEMIDE 10 MG/ML
INJECTION INTRAMUSCULAR; INTRAVENOUS
Status: DISPENSED
Start: 2018-02-06

## (undated) RX ORDER — CARDIOPLEG/ORGAN PRESERV NO.1 9-198-2-1
BOTTLE PERFUSION
Status: DISPENSED
Start: 2018-02-06

## (undated) RX ORDER — LIDOCAINE HYDROCHLORIDE 20 MG/ML
INJECTION, SOLUTION EPIDURAL; INFILTRATION; INTRACAUDAL; PERINEURAL
Status: DISPENSED
Start: 2018-02-06

## (undated) RX ORDER — ACETAMINOPHEN 500 MG
TABLET ORAL
Status: DISPENSED
Start: 2025-07-19

## (undated) RX ORDER — HYDROMORPHONE HCL/0.9% NACL/PF 0.2MG/0.2
SYRINGE (ML) INTRAVENOUS
Status: DISPENSED
Start: 2018-02-06

## (undated) RX ORDER — ONDANSETRON 2 MG/ML
INJECTION INTRAMUSCULAR; INTRAVENOUS
Status: DISPENSED
Start: 2018-02-06